# Patient Record
Sex: MALE | Race: WHITE | NOT HISPANIC OR LATINO | Employment: OTHER | URBAN - NONMETROPOLITAN AREA
[De-identification: names, ages, dates, MRNs, and addresses within clinical notes are randomized per-mention and may not be internally consistent; named-entity substitution may affect disease eponyms.]

---

## 2017-01-04 ENCOUNTER — OFFICE VISIT (OUTPATIENT)
Dept: GASTROENTEROLOGY | Facility: CLINIC | Age: 82
End: 2017-01-04

## 2017-01-04 ENCOUNTER — TELEPHONE (OUTPATIENT)
Dept: GASTROENTEROLOGY | Facility: CLINIC | Age: 82
End: 2017-01-04

## 2017-01-04 VITALS
SYSTOLIC BLOOD PRESSURE: 170 MMHG | DIASTOLIC BLOOD PRESSURE: 77 MMHG | BODY MASS INDEX: 23.89 KG/M2 | WEIGHT: 157.6 LBS | HEART RATE: 72 BPM | HEIGHT: 68 IN

## 2017-01-04 DIAGNOSIS — D62 ACUTE BLOOD LOSS ANEMIA: ICD-10-CM

## 2017-01-04 DIAGNOSIS — K62.5 HEMORRHAGE OF ANUS AND RECTUM: Primary | ICD-10-CM

## 2017-01-04 LAB
HCT VFR BLD CALC: 38.4 % (ref 39–49)
HGB BLD-MCNC: 13.2 GM/DL (ref 13.7–17.3)
IRON SERPL-MCNC: 54 UG/DL (ref 49–181)

## 2017-01-04 PROCEDURE — 99213 OFFICE O/P EST LOW 20 MIN: CPT | Performed by: PHYSICIAN ASSISTANT

## 2017-01-04 RX ORDER — ASPIRIN 81 MG/1
81 TABLET ORAL DAILY
COMMUNITY

## 2017-01-04 NOTE — TELEPHONE ENCOUNTER
Patient has been contacted with results, recommend OTC iron for 3 months. Patient voiced understanding.

## 2017-01-04 NOTE — LETTER
January 12, 2017     Al Calero MD  [address] [place] [address]    Patient: Brown Deng   YOB: 1935   Date of Visit: 1/4/2017       Dear Dr. Galilea MD:    Brown Deng was in my office today. Below is a copy of my note.    If you have questions, please do not hesitate to call me. I look forward to following Brown along with you.         Sincerely,        Iraj Strickland PA-C        CC: No Recipients    Chief Complaint   Patient presents with   • Acute Anemia   • Hemorrhage of Anus       Subjective    Brown Deng is a 81 y.o. male. he is here today for follow-up.    History of Present Illness    Patient seen on a recheck of his acute hemorrhage, acute blood loss anemia.  Last seen 11/16/16.  Patient underwent colonoscopy on 11/18/16 showed multiple diverticuli in the sigmoid and descending colon, internal/external hemorrhoids, fair prep.  Recommended repeat colonoscopy in 5 years.  Patient currently denies abdominal pain, nausea vomiting, bowels are moving without further bleeding.  Weight is up 3 pounds.  He has resumed his lower dose aspirin.    A/P: Diverticulosis with a fair prep.  I recommended repeat laboratory studies if he is showing anemia he would need iron supplementation as well as follow-up.  If his studies are normal would suggest no further workup at this time.     The following portions of the patient's history were reviewed and updated as appropriate:   Past Medical History   Diagnosis Date   • Craggy prostate    • Heart trouble    • Pernicious anemia    • Rectal hemorrhage    • Screening for malignant neoplasm of prostate      Past Surgical History   Procedure Laterality Date   • Cardiac ablation     • Upper gastrointestinal endoscopy  01/14/2011   • Colonoscopy  01/14/2011   • Colonoscopy  11/18/2016     Family History   Problem Relation Age of Onset   • Heart disease Other    • Ulcers Other    • Cancer Other    • Cholelithiasis Other    •  "Hypertension Other        No Known Allergies  Social History     Social History   • Marital status:      Spouse name: N/A   • Number of children: N/A   • Years of education: N/A     Social History Main Topics   • Smoking status: Former Smoker   • Smokeless tobacco: Never Used   • Alcohol use No   • Drug use: No   • Sexual activity: Not Asked     Other Topics Concern   • None     Social History Narrative       Current Outpatient Prescriptions:   •  aspirin 81 MG EC tablet, Take 81 mg by mouth Daily., Disp: , Rfl:   •  atorvastatin (LIPITOR) 10 MG tablet, Take 10 mg by mouth Daily., Disp: , Rfl:   •  metoprolol succinate XL (TOPROL-XL) 50 MG 24 hr tablet, Take 50 mg by mouth Daily., Disp: , Rfl:   Review of Systems  Review of Systems       Objective      Visit Vitals   • /77 (BP Location: Left arm)   • Pulse 72   • Ht 68\" (172.7 cm)   • Wt 157 lb 9.6 oz (71.5 kg)   • BMI 23.96 kg/m2     Physical Exam   Constitutional: He is oriented to person, place, and time. He appears well-developed and well-nourished. No distress.   pale   HENT:   Head: Normocephalic and atraumatic.   Eyes: EOM are normal. Pupils are equal, round, and reactive to light.   Neck: Normal range of motion.   Cardiovascular: Normal rate, regular rhythm and normal heart sounds.    Pulmonary/Chest: Effort normal and breath sounds normal.   Abdominal: Soft. Bowel sounds are normal. He exhibits no shifting dullness, no distension, no abdominal bruit, no ascites and no mass. There is no hepatosplenomegaly. There is no tenderness. There is no rigidity, no rebound, no guarding and no CVA tenderness. No hernia. Hernia confirmed negative in the ventral area.   Musculoskeletal: Normal range of motion.   Neurological: He is alert and oriented to person, place, and time.   Skin: Skin is warm and dry.   Psychiatric: He has a normal mood and affect. His behavior is normal. Judgment and thought content normal.   Nursing note and vitals " "reviewed.    Conversion Encounter on 01/14/2011   Component Date Value Ref Range Status   • Spec Descr 1 01/14/2011 SPECIMEN(S): A SMALL BOWEL BIOPSY   Final   • Specimen description 2 01/14/2011 SPECIMEN(S): B ANTRAL BIOPSY   Final   • Clinical Information 01/14/2011    Final                    Value:  CLINICAL HISTORY:  None Given     • Clinical Diagnosis 01/14/2011    Final                    Value:  CLINICAL DIAGNOSIS:  Patient with anemia     • Gross Description 01/14/2011    Final                    Value:  GROSS DESCRIPTION:  Specimen A is labeled \"#1 small bowel\" and consists of a tan fragment  measuring 0.4 x 0.3 x 0.2 cm.  Totally submitted.       Specimen B is labeled \"#2 antrum\" and consists of 2 tan fragments  measuring 0.4 x 0.4 x 0.2 cm together.  Totally submitted.     • Final Diagnosis 01/14/2011    Final                    Value:  FINAL DIAGNOSIS:  A.  SMALL BOWEL, BIOPSY:            NO SIGNIFICANT HISTOLOGIC ABNORMALITY.  B.  GASTRIC ANTRUM, BIOPSY:            LYMPHOID AGGREGATES, FOCAL.            NEGATIVE FOR HELICOBACTER PYLORI (HP IMMUNOSTAIN).     • Comment 01/14/2011    Final                    Value:  COMMENT:  HP immunostain was developed and its performance characteristics  determined by [facility].  It has not been  cleared or approved by the U.S. Food and Drug Administration.  The FDA  has determined that such clearance or approval is not necessary.  This  test is used for clinical purposes.  It should not be regarded as  investigational or for research.  This laboratory is certified under the  Clinical Laboratory Improvement Amendments of 1988 (CLIA-88) as  qualified to perform high complexity clinical laboratory testing.    DIAGNOSIS CODE:  2     • CONVERTED (HISTORICAL) FINAL PATHO* 01/14/2011    Final                    Value:Diagnostician:  JOSELIN CONSTANTINO M.D.  Pathologist  Electronically Signed 01/17/2011       Assessment/Plan      1. Hemorrhage of " "anus and rectum    2. Acute blood loss anemia    .   Brown was seen today for acute anemia and hemorrhage of anus.    Diagnoses and all orders for this visit:    Hemorrhage of anus and rectum  -     Hemoglobin & Hematocrit, Blood  -     Iron    Acute blood loss anemia  -     Hemoglobin & Hematocrit, Blood  -     Iron        Orders placed during this encounter include:  Orders Placed This Encounter   Procedures   • Hemoglobin & Hematocrit, Blood   • Iron       Medications prescribed:  No orders of the defined types were placed in this encounter.    Discontinued Medications       Reason for Discontinue    aspirin 325 MG EC tablet Dose adjustment        Requested Prescriptions      No prescriptions requested or ordered in this encounter       Review and/or summary of lab tests, radiology, procedures, medications. Review and summary of old records and obtaining of history. The risks and benefits of my recommendations, as well as other treatment options were discussed with the patient today. Questions were answered.    Follow-up: Return if symptoms worsen or fail to improve, for Next scheduled follow up.           This document has been electronically signed by Iraj Strickland PA-C on January 12, 2017 6:30 PM      Results for orders placed or performed in visit on 01/04/17   Hemoglobin & Hematocrit, Blood   Result Value Ref Range    Hemoglobin 13.2 (L) 13.7 - 17.3 gm/dl    Hematocrit 38.4 (L) 39.0 - 49.0 %   Iron   Result Value Ref Range    Iron 54 49 - 181 ug/dl   Results for orders placed or performed in visit on 01/14/11   Converted Surgical Pathology   Result Value Ref Range    Spec Descr 1 SPECIMEN(S): A SMALL BOWEL BIOPSY     Specimen description 2 SPECIMEN(S): B ANTRAL BIOPSY     Clinical Information   CLINICAL HISTORY:  None Given       Clinical Diagnosis   CLINICAL DIAGNOSIS:  Patient with anemia       Gross Description         GROSS DESCRIPTION:  Specimen A is labeled \"#1 small bowel\" and consists of a tan " "fragment  measuring 0.4 x 0.3 x 0.2 cm.  Totally submitted.       Specimen B is labeled \"#2 antrum\" and consists of 2 tan fragments  measuring 0.4 x 0.4 x 0.2 cm together.  Totally submitted.      Final Diagnosis         FINAL DIAGNOSIS:  A.  SMALL BOWEL, BIOPSY:            NO SIGNIFICANT HISTOLOGIC ABNORMALITY.  B.  GASTRIC ANTRUM, BIOPSY:            LYMPHOID AGGREGATES, FOCAL.            NEGATIVE FOR HELICOBACTER PYLORI (HP IMMUNOSTAIN).      Comment         COMMENT:  HP immunostain was developed and its performance characteristics  determined by [facility].  It has not been  cleared or approved by the U.S. Food and Drug Administration.  The FDA  has determined that such clearance or approval is not necessary.  This  test is used for clinical purposes.  It should not be regarded as  investigational or for research.  This laboratory is certified under the  Clinical Laboratory Improvement Amendments of 1988 (CLIA-88) as  qualified to perform high complexity clinical laboratory testing.    DIAGNOSIS CODE:  2      CONVERTED (HISTORICAL) FINAL PATHOLOGIST       Diagnostician:  JOSELIN CONSTANTINO M.D.  Pathologist  Electronically Signed 01/17/2011       "

## 2017-01-04 NOTE — PROGRESS NOTES
Chief Complaint   Patient presents with   • Acute Anemia   • Hemorrhage of Anus       Subjective    Brown Deng is a 81 y.o. male. he is here today for follow-up.    History of Present Illness    Patient seen on a recheck of his acute hemorrhage, acute blood loss anemia.  Last seen 11/16/16.  Patient underwent colonoscopy on 11/18/16 showed multiple diverticuli in the sigmoid and descending colon, internal/external hemorrhoids, fair prep.  Recommended repeat colonoscopy in 5 years.  Patient currently denies abdominal pain, nausea vomiting, bowels are moving without further bleeding.  Weight is up 3 pounds.  He has resumed his lower dose aspirin.    A/P: Diverticulosis with a fair prep.  I recommended repeat laboratory studies if he is showing anemia he would need iron supplementation as well as follow-up.  If his studies are normal would suggest no further workup at this time.     The following portions of the patient's history were reviewed and updated as appropriate:   Past Medical History   Diagnosis Date   • Craggy prostate    • Heart trouble    • Pernicious anemia    • Rectal hemorrhage    • Screening for malignant neoplasm of prostate      Past Surgical History   Procedure Laterality Date   • Cardiac ablation     • Upper gastrointestinal endoscopy  01/14/2011   • Colonoscopy  01/14/2011   • Colonoscopy  11/18/2016     Family History   Problem Relation Age of Onset   • Heart disease Other    • Ulcers Other    • Cancer Other    • Cholelithiasis Other    • Hypertension Other        No Known Allergies  Social History     Social History   • Marital status:      Spouse name: N/A   • Number of children: N/A   • Years of education: N/A     Social History Main Topics   • Smoking status: Former Smoker   • Smokeless tobacco: Never Used   • Alcohol use No   • Drug use: No   • Sexual activity: Not Asked     Other Topics Concern   • None     Social History Narrative       Current Outpatient Prescriptions:   •   "aspirin 81 MG EC tablet, Take 81 mg by mouth Daily., Disp: , Rfl:   •  atorvastatin (LIPITOR) 10 MG tablet, Take 10 mg by mouth Daily., Disp: , Rfl:   •  metoprolol succinate XL (TOPROL-XL) 50 MG 24 hr tablet, Take 50 mg by mouth Daily., Disp: , Rfl:   Review of Systems  Review of Systems       Objective      Visit Vitals   • /77 (BP Location: Left arm)   • Pulse 72   • Ht 68\" (172.7 cm)   • Wt 157 lb 9.6 oz (71.5 kg)   • BMI 23.96 kg/m2     Physical Exam   Constitutional: He is oriented to person, place, and time. He appears well-developed and well-nourished. No distress.   pale   HENT:   Head: Normocephalic and atraumatic.   Eyes: EOM are normal. Pupils are equal, round, and reactive to light.   Neck: Normal range of motion.   Cardiovascular: Normal rate, regular rhythm and normal heart sounds.    Pulmonary/Chest: Effort normal and breath sounds normal.   Abdominal: Soft. Bowel sounds are normal. He exhibits no shifting dullness, no distension, no abdominal bruit, no ascites and no mass. There is no hepatosplenomegaly. There is no tenderness. There is no rigidity, no rebound, no guarding and no CVA tenderness. No hernia. Hernia confirmed negative in the ventral area.   Musculoskeletal: Normal range of motion.   Neurological: He is alert and oriented to person, place, and time.   Skin: Skin is warm and dry.   Psychiatric: He has a normal mood and affect. His behavior is normal. Judgment and thought content normal.   Nursing note and vitals reviewed.    Conversion Encounter on 01/14/2011   Component Date Value Ref Range Status   • Spec Descr 1 01/14/2011 SPECIMEN(S): A SMALL BOWEL BIOPSY   Final   • Specimen description 2 01/14/2011 SPECIMEN(S): B ANTRAL BIOPSY   Final   • Clinical Information 01/14/2011    Final                    Value:  CLINICAL HISTORY:  None Given     • Clinical Diagnosis 01/14/2011    Final                    Value:  CLINICAL DIAGNOSIS:  Patient with anemia     • Gross Description " "01/14/2011    Final                    Value:  GROSS DESCRIPTION:  Specimen A is labeled \"#1 small bowel\" and consists of a tan fragment  measuring 0.4 x 0.3 x 0.2 cm.  Totally submitted.       Specimen B is labeled \"#2 antrum\" and consists of 2 tan fragments  measuring 0.4 x 0.4 x 0.2 cm together.  Totally submitted.     • Final Diagnosis 01/14/2011    Final                    Value:  FINAL DIAGNOSIS:  A.  SMALL BOWEL, BIOPSY:            NO SIGNIFICANT HISTOLOGIC ABNORMALITY.  B.  GASTRIC ANTRUM, BIOPSY:            LYMPHOID AGGREGATES, FOCAL.            NEGATIVE FOR HELICOBACTER PYLORI (HP IMMUNOSTAIN).     • Comment 01/14/2011    Final                    Value:  COMMENT:  HP immunostain was developed and its performance characteristics  determined by [facility].  It has not been  cleared or approved by the U.S. Food and Drug Administration.  The FDA  has determined that such clearance or approval is not necessary.  This  test is used for clinical purposes.  It should not be regarded as  investigational or for research.  This laboratory is certified under the  Clinical Laboratory Improvement Amendments of 1988 (CLIA-88) as  qualified to perform high complexity clinical laboratory testing.    DIAGNOSIS CODE:  2     • CONVERTED (HISTORICAL) FINAL PATHO* 01/14/2011    Final                    Value:Diagnostician:  JOSELIN CONSTANTINO M.D.  Pathologist  Electronically Signed 01/17/2011       Assessment/Plan      1. Hemorrhage of anus and rectum    2. Acute blood loss anemia    .   Brown was seen today for acute anemia and hemorrhage of anus.    Diagnoses and all orders for this visit:    Hemorrhage of anus and rectum  -     Hemoglobin & Hematocrit, Blood  -     Iron    Acute blood loss anemia  -     Hemoglobin & Hematocrit, Blood  -     Iron        Orders placed during this encounter include:  Orders Placed This Encounter   Procedures   • Hemoglobin & Hematocrit, Blood   • Iron " "      Medications prescribed:  No orders of the defined types were placed in this encounter.    Discontinued Medications       Reason for Discontinue    aspirin 325 MG EC tablet Dose adjustment        Requested Prescriptions      No prescriptions requested or ordered in this encounter       Review and/or summary of lab tests, radiology, procedures, medications. Review and summary of old records and obtaining of history. The risks and benefits of my recommendations, as well as other treatment options were discussed with the patient today. Questions were answered.    Follow-up: Return if symptoms worsen or fail to improve, for Next scheduled follow up.           This document has been electronically signed by Iraj Strickland PA-C on January 12, 2017 6:30 PM      Results for orders placed or performed in visit on 01/04/17   Hemoglobin & Hematocrit, Blood   Result Value Ref Range    Hemoglobin 13.2 (L) 13.7 - 17.3 gm/dl    Hematocrit 38.4 (L) 39.0 - 49.0 %   Iron   Result Value Ref Range    Iron 54 49 - 181 ug/dl   Results for orders placed or performed in visit on 01/14/11   Converted Surgical Pathology   Result Value Ref Range    Spec Descr 1 SPECIMEN(S): A SMALL BOWEL BIOPSY     Specimen description 2 SPECIMEN(S): B ANTRAL BIOPSY     Clinical Information   CLINICAL HISTORY:  None Given       Clinical Diagnosis   CLINICAL DIAGNOSIS:  Patient with anemia       Gross Description         GROSS DESCRIPTION:  Specimen A is labeled \"#1 small bowel\" and consists of a tan fragment  measuring 0.4 x 0.3 x 0.2 cm.  Totally submitted.       Specimen B is labeled \"#2 antrum\" and consists of 2 tan fragments  measuring 0.4 x 0.4 x 0.2 cm together.  Totally submitted.      Final Diagnosis         FINAL DIAGNOSIS:  A.  SMALL BOWEL, BIOPSY:            NO SIGNIFICANT HISTOLOGIC ABNORMALITY.  B.  GASTRIC ANTRUM, BIOPSY:            LYMPHOID AGGREGATES, FOCAL.            NEGATIVE FOR HELICOBACTER PYLORI (HP IMMUNOSTAIN).      Comment   "       COMMENT:  HP immunostain was developed and its performance characteristics  determined by [facility].  It has not been  cleared or approved by the U.S. Food and Drug Administration.  The FDA  has determined that such clearance or approval is not necessary.  This  test is used for clinical purposes.  It should not be regarded as  investigational or for research.  This laboratory is certified under the  Clinical Laboratory Improvement Amendments of 1988 (CLIA-88) as  qualified to perform high complexity clinical laboratory testing.    DIAGNOSIS CODE:  2      CONVERTED (HISTORICAL) FINAL PATHOLOGIST       Diagnostician:  JOSELIN CONSTANTINO M.D.  Pathologist  Electronically Signed 01/17/2011

## 2017-01-04 NOTE — MR AVS SNAPSHOT
Brown Deng   1/4/2017 2:15 PM   Office Visit    Dept Phone:  [phone]   Encounter #:  [id number]    Provider:  Iraj Strickland PA-C   Department:  [facility] GASTROENTEROLOGY                Your Full Care Plan              Today's Medication Changes          These changes are accurate as of: 1/4/17  2:16 PM.  If you have any questions, ask your nurse or doctor.               Medication(s)that have changed:     aspirin 81 MG EC tablet   Take 81 mg by mouth Daily.   What changed:  Another medication with the same name was removed. Continue taking this medication, and follow the directions you see here.   Changed by:  Iraj Strickland PA-C                  Your Updated Medication List          This list is accurate as of: 1/4/17  2:16 PM.  Always use your most recent med list.                aspirin 81 MG EC tablet       atorvastatin 10 MG tablet   Commonly known as:  LIPITOR       metoprolol succinate XL 50 MG 24 hr tablet   Commonly known as:  TOPROL-XL               We Performed the Following     Hemoglobin & Hematocrit, Blood     Iron       You Were Diagnosed With        Codes Comments    Hemorrhage of anus and rectum    -  Primary ICD-10-CM: K62.5  ICD-9-CM: 569.3     Acute blood loss anemia     ICD-10-CM: D62  ICD-9-CM: 285.1       Instructions     None    Patient Instructions History      Upcoming Appointments     Visit Type Date Time Department    OFFICE VISIT 1/4/2017  2:15 PM [facility]STROENT  [facility]    OFFICE VISIT 3/6/2017  1:15 PM [facility] GASTROENT  [facility]    OFFICE VISIT 8/25/2017  9:00 AM [facility] HEART CARE [facility] Signup     Our records indicate that you have an active [email or web address] account.    You can view your After Visit Summary by going to [email or web address] and logging in with your [employer] username and password.  If you don't have a [employer] username and password but a parent or guardian has access to your record, the parent or guardian  "should login with their own [employer] username and password and access your record to view the After Visit Summary.    If you have questions, you can email [email or web address] or call [phone] to talk to our [employer] staff.  Remember, [employer] is NOT to be used for urgent needs.  For medical emergencies, dial 911.               Other Info from Your Visit           Your Appointments     Mar 06, 2017  1:15 PM CST   Office Visit with Iraj Strickland PA-C   [facility] GASTROENTEROLOGY (--)    [address] [place] [address] [address]  [facility] [address] [place] [address] [phone]           Arrive 15 minutes prior to appointment.            Aug 25, 2017  9:00 AM CDT   Office Visit with Ceferino Young MD   [facility] CARDIOLOGY (--)    [address] [facility] [address]  [facility] [address] [phone]           Arrive 15 minutes prior to appointment.              Allergies     No Known Allergies      Reason for Visit     Acute Anemia     Hemorrhage of Anus           Vital Signs     Blood Pressure Pulse Height Weight Body Mass Index Smoking Status    170/77 (BP Location: Left arm) 72 68\" (172.7 cm) 157 lb 9.6 oz (71.5 kg) 23.96 kg/m2 Former Smoker      Problems and Diagnoses Noted     Hemorrhage of anus and rectum    -  Primary    Acute blood loss anemia            "

## 2017-01-16 RX ORDER — METOPROLOL SUCCINATE 50 MG/1
TABLET, EXTENDED RELEASE ORAL
Qty: 90 TABLET | Refills: 3 | Status: SHIPPED | OUTPATIENT
Start: 2017-01-16 | End: 2017-03-08 | Stop reason: SDUPTHER

## 2017-03-06 ENCOUNTER — APPOINTMENT (OUTPATIENT)
Dept: LAB | Facility: HOSPITAL | Age: 82
End: 2017-03-06

## 2017-03-06 ENCOUNTER — OFFICE VISIT (OUTPATIENT)
Dept: GASTROENTEROLOGY | Facility: CLINIC | Age: 82
End: 2017-03-06

## 2017-03-06 VITALS
SYSTOLIC BLOOD PRESSURE: 182 MMHG | HEIGHT: 68 IN | DIASTOLIC BLOOD PRESSURE: 90 MMHG | WEIGHT: 154.7 LBS | BODY MASS INDEX: 23.45 KG/M2 | HEART RATE: 69 BPM

## 2017-03-06 DIAGNOSIS — K62.5 HEMORRHAGE OF ANUS AND RECTUM: Primary | ICD-10-CM

## 2017-03-06 DIAGNOSIS — D50.0 IRON DEFICIENCY ANEMIA DUE TO CHRONIC BLOOD LOSS: ICD-10-CM

## 2017-03-06 LAB
HCT VFR BLD AUTO: 45.6 % (ref 39–49)
HGB BLD-MCNC: 16 G/DL (ref 13.7–17.3)
IRON 24H UR-MRATE: 90 MCG/DL (ref 49–181)

## 2017-03-06 PROCEDURE — 36415 COLL VENOUS BLD VENIPUNCTURE: CPT | Performed by: PHYSICIAN ASSISTANT

## 2017-03-06 PROCEDURE — 85018 HEMOGLOBIN: CPT | Performed by: PHYSICIAN ASSISTANT

## 2017-03-06 PROCEDURE — 83540 ASSAY OF IRON: CPT | Performed by: PHYSICIAN ASSISTANT

## 2017-03-06 PROCEDURE — 99213 OFFICE O/P EST LOW 20 MIN: CPT | Performed by: PHYSICIAN ASSISTANT

## 2017-03-06 PROCEDURE — 85014 HEMATOCRIT: CPT | Performed by: PHYSICIAN ASSISTANT

## 2017-03-06 RX ORDER — FERROUS SULFATE 325(65) MG
325 TABLET ORAL
COMMUNITY
End: 2018-09-06

## 2017-03-06 NOTE — PROGRESS NOTES
Chief Complaint   Patient presents with   • Acute Blood Loss Anemia   • Hemorrhage of anus and rectum       ENDO PROCEDURE ORDERED:    Subjective    Brown Deng is a 81 y.o. male. he is here today for follow-up.    History of Present Illness    Patient seen on a recheck of his acute GI hemorrhage, anemia.  Last seen 1/4/17.  Patient is been taking over-the-counter iron, laboratory at last visit showed hemoglobin 13.2, hematocrit 38.4, serum iron of 54.  Patient is continue to take iron supplementation.  His weight is down 3 pounds since last visit.  He denies abdominal pain, nausea vomiting, bowels are moving, he is seen no further blood.  Last colonoscopy showed extensive diverticulosis on 11/18/16.    A/P: Iron deficiency anemia secondary to acute GI hemorrhage.  We'll repeat laboratories, if there are now normal he may discontinue iron supplementation and follow up as previously scheduled.  If he still shows anemia will continue to follow.     The following portions of the patient's history were reviewed and updated as appropriate:   Past Medical History   Diagnosis Date   • Craggy prostate    • Heart trouble    • Pernicious anemia    • Rectal hemorrhage    • Screening for malignant neoplasm of prostate      Past Surgical History   Procedure Laterality Date   • Cardiac ablation     • Upper gastrointestinal endoscopy  01/14/2011   • Colonoscopy  01/14/2011   • Colonoscopy  11/18/2016     Family History   Problem Relation Age of Onset   • Heart disease Other    • Ulcers Other    • Cancer Other    • Cholelithiasis Other    • Hypertension Other        No Known Allergies  Social History     Social History   • Marital status:      Spouse name: N/A   • Number of children: N/A   • Years of education: N/A     Social History Main Topics   • Smoking status: Former Smoker   • Smokeless tobacco: Never Used   • Alcohol use No   • Drug use: No   • Sexual activity: Not Asked     Other Topics Concern   • None  "    Social History Narrative       Current Outpatient Prescriptions:   •  aspirin 81 MG EC tablet, Take 81 mg by mouth Daily., Disp: , Rfl:   •  ferrous sulfate 325 (65 FE) MG tablet, Take 325 mg by mouth Daily With Breakfast., Disp: , Rfl:   •  atorvastatin (LIPITOR) 10 MG tablet, Take 1 tablet by mouth Daily., Disp: 90 tablet, Rfl: 2  •  metoprolol succinate XL (TOPROL-XL) 50 MG 24 hr tablet, Take 1 tablet by mouth Daily., Disp: 90 tablet, Rfl: 2  Review of Systems  Review of Systems       Objective      Visit Vitals   • BP (!) 182/90 (BP Location: Left arm)   • Pulse 69   • Ht 68\" (172.7 cm)   • Wt 154 lb 11.2 oz (70.2 kg)   • BMI 23.52 kg/m2     Physical Exam   Constitutional: He is oriented to person, place, and time. He appears well-developed and well-nourished. No distress.   pale   HENT:   Head: Normocephalic and atraumatic.   Eyes: EOM are normal. Pupils are equal, round, and reactive to light.   Neck: Normal range of motion.   Cardiovascular: Normal rate, regular rhythm and normal heart sounds.    Pulmonary/Chest: Effort normal and breath sounds normal.   Abdominal: Soft. Bowel sounds are normal. He exhibits no shifting dullness, no distension, no abdominal bruit, no ascites and no mass. There is no hepatosplenomegaly. There is no tenderness. There is no rigidity, no rebound, no guarding and no CVA tenderness. No hernia. Hernia confirmed negative in the ventral area.   Musculoskeletal: Normal range of motion.   Neurological: He is alert and oriented to person, place, and time.   Skin: Skin is warm and dry.   Psychiatric: He has a normal mood and affect. His behavior is normal. Judgment and thought content normal.   Nursing note and vitals reviewed.    Office Visit on 01/04/2017   Component Date Value Ref Range Status   • Hemoglobin 01/04/2017 13.2* 13.7 - 17.3 gm/dl Final   • Hematocrit 01/04/2017 38.4* 39.0 - 49.0 % Final   • Iron 01/04/2017 54  49 - 181 ug/dl Final     Assessment/Plan      1. Hemorrhage " of anus and rectum    2. Iron deficiency anemia due to chronic blood loss    .   Brown was seen today for acute blood loss anemia and hemorrhage of anus and rectum.    Diagnoses and all orders for this visit:    Hemorrhage of anus and rectum  -     Hemoglobin & Hematocrit, Blood  -     Iron    Iron deficiency anemia due to chronic blood loss  -     Hemoglobin & Hematocrit, Blood  -     Iron        Orders placed during this encounter include:  Orders Placed This Encounter   Procedures   • Hemoglobin & Hematocrit, Blood   • Iron       Medications prescribed:  No orders of the defined types were placed in this encounter.      Requested Prescriptions      No prescriptions requested or ordered in this encounter       Review and/or summary of lab tests, radiology, procedures, medications. Review and summary of old records and obtaining of history. The risks and benefits of my recommendations, as well as other treatment options were discussed with the patient today. Questions were answered.    Follow-up: Return if symptoms worsen or fail to improve, for lab today.     * Surgery not found *      This document has been electronically signed by Iraj Strickland PA-C on March 13, 2017 7:03 PM      Results for orders placed or performed in visit on 03/06/17   Hemoglobin & Hematocrit, Blood   Result Value Ref Range    Hemoglobin 16.0 13.7 - 17.3 g/dL    Hematocrit 45.6 39.0 - 49.0 %   Iron   Result Value Ref Range    Iron 90 49 - 181 mcg/dL   Results for orders placed or performed in visit on 01/04/17   Hemoglobin & Hematocrit, Blood   Result Value Ref Range    Hemoglobin 13.2 (L) 13.7 - 17.3 gm/dl    Hematocrit 38.4 (L) 39.0 - 49.0 %   Iron   Result Value Ref Range    Iron 54 49 - 181 ug/dl   Results for orders placed or performed in visit on 01/14/11   Converted Surgical Pathology   Result Value Ref Range    Spec Descr 1 SPECIMEN(S): A SMALL BOWEL BIOPSY     Specimen description 2 SPECIMEN(S): B ANTRAL BIOPSY     Clinical  "Information   CLINICAL HISTORY:  None Given       Clinical Diagnosis   CLINICAL DIAGNOSIS:  Patient with anemia       Gross Description         GROSS DESCRIPTION:  Specimen A is labeled \"#1 small bowel\" and consists of a tan fragment  measuring 0.4 x 0.3 x 0.2 cm.  Totally submitted.       Specimen B is labeled \"#2 antrum\" and consists of 2 tan fragments  measuring 0.4 x 0.4 x 0.2 cm together.  Totally submitted.      Final Diagnosis         FINAL DIAGNOSIS:  A.  SMALL BOWEL, BIOPSY:            NO SIGNIFICANT HISTOLOGIC ABNORMALITY.  B.  GASTRIC ANTRUM, BIOPSY:            LYMPHOID AGGREGATES, FOCAL.            NEGATIVE FOR HELICOBACTER PYLORI (HP IMMUNOSTAIN).      Comment         COMMENT:  HP immunostain was developed and its performance characteristics  determined by [facility].  It has not been  cleared or approved by the U.S. Food and Drug Administration.  The FDA  has determined that such clearance or approval is not necessary.  This  test is used for clinical purposes.  It should not be regarded as  investigational or for research.  This laboratory is certified under the  Clinical Laboratory Improvement Amendments of 1988 (CLIA-88) as  qualified to perform high complexity clinical laboratory testing.    DIAGNOSIS CODE:  2      CONVERTED (HISTORICAL) FINAL PATHOLOGIST       Diagnostician:  JOSELIN CONSTANTINO M.D.  Pathologist  Electronically Signed 01/17/2011         Some portions of this note have been dictated using voice recognition software and may contain errors and/or omissions.   "

## 2017-03-07 ENCOUNTER — TELEPHONE (OUTPATIENT)
Dept: GASTROENTEROLOGY | Facility: CLINIC | Age: 82
End: 2017-03-07

## 2017-03-07 NOTE — TELEPHONE ENCOUNTER
----- Message from Iraj Strickland PA-C sent at 3/6/2017  5:01 PM CST -----  Please call the patient regarding his abnormal result. LABS normal may discontinue Iron

## 2017-03-07 NOTE — TELEPHONE ENCOUNTER
A voicemail was left for Mr. Brown Deng on 3-6-17 for the patient to return my call for lab results. The patient's son Rohan Wilkerson called back today on the patient's behalf for those results. Patient's son Rohan has been made aware that Mr. Brown Deng's lab's were normal and that the patient may discontinue his iron. Rohan Deng voiced understanding.

## 2017-03-08 RX ORDER — METOPROLOL SUCCINATE 50 MG/1
50 TABLET, EXTENDED RELEASE ORAL DAILY
Qty: 90 TABLET | Refills: 2 | Status: SHIPPED | OUTPATIENT
Start: 2017-03-08 | End: 2017-11-21 | Stop reason: SDUPTHER

## 2017-03-08 RX ORDER — ATORVASTATIN CALCIUM 10 MG/1
10 TABLET, FILM COATED ORAL DAILY
Qty: 90 TABLET | Refills: 2 | Status: SHIPPED | OUTPATIENT
Start: 2017-03-08 | End: 2017-11-21 | Stop reason: SDUPTHER

## 2017-08-25 ENCOUNTER — OFFICE VISIT (OUTPATIENT)
Dept: CARDIOLOGY | Facility: CLINIC | Age: 82
End: 2017-08-25

## 2017-08-25 VITALS
DIASTOLIC BLOOD PRESSURE: 80 MMHG | BODY MASS INDEX: 23.04 KG/M2 | HEIGHT: 68 IN | SYSTOLIC BLOOD PRESSURE: 120 MMHG | HEART RATE: 70 BPM | WEIGHT: 152 LBS

## 2017-08-25 DIAGNOSIS — R00.2 PALPITATION: ICD-10-CM

## 2017-08-25 DIAGNOSIS — I48.3 TYPICAL ATRIAL FLUTTER (HCC): ICD-10-CM

## 2017-08-25 DIAGNOSIS — E78.5 HYPERLIPIDEMIA, UNSPECIFIED HYPERLIPIDEMIA TYPE: ICD-10-CM

## 2017-08-25 DIAGNOSIS — I10 ESSENTIAL HYPERTENSION: Primary | ICD-10-CM

## 2017-08-25 PROCEDURE — 99213 OFFICE O/P EST LOW 20 MIN: CPT | Performed by: INTERNAL MEDICINE

## 2017-08-25 NOTE — PROGRESS NOTES
Brown Deng  81 y.o. male    08/25/2017  1. Essential hypertension    2. Hyperlipidemia, unspecified hyperlipidemia type    3. Typical atrial flutter    4. Palpitation        History of Present Illness:Routine follow-up    81 years old patient to had to medical history significant for palpitations, documented atrial flutter with counterclockwise mechanism underwent EP study and flutter ablation with bidirectional block.  The patient noted to have palpitation secondary to premature supraventricular ectopic beat.  Started on metoprolol without  further recurrence of palpitation.  He is  pleased with the clinical outcome.  He denies orthopnea, PND, nauseous, vomiting.  Denies any polydipsia polyuria.  Denies any fever cough or chills.  Had a previous echocardiographic study  In 2011 with normal left  systolic function without any significant valvular abnormality  .            SUBJECTIVE    No Known Allergies      Past Medical History:   Diagnosis Date   • Craggy prostate    • Heart trouble    • Pernicious anemia    • Rectal hemorrhage    • Screening for malignant neoplasm of prostate          Past Surgical History:   Procedure Laterality Date   • CARDIAC ABLATION     • COLONOSCOPY  01/14/2011   • COLONOSCOPY  11/18/2016   • UPPER GASTROINTESTINAL ENDOSCOPY  01/14/2011         Family History   Problem Relation Age of Onset   • Heart disease Other    • Ulcers Other    • Cancer Other    • Cholelithiasis Other    • Hypertension Other          Social History     Social History   • Marital status:      Spouse name: N/A   • Number of children: N/A   • Years of education: N/A     Occupational History   • Not on file.     Social History Main Topics   • Smoking status: Former Smoker   • Smokeless tobacco: Never Used   • Alcohol use No   • Drug use: No   • Sexual activity: Not on file     Other Topics Concern   • Not on file     Social History Narrative         Current Outpatient Prescriptions   Medication Sig  "Dispense Refill   • aspirin 81 MG EC tablet Take 81 mg by mouth Daily.     • atorvastatin (LIPITOR) 10 MG tablet Take 1 tablet by mouth Daily. 90 tablet 2   • ferrous sulfate 325 (65 FE) MG tablet Take 325 mg by mouth Daily With Breakfast.     • metoprolol succinate XL (TOPROL-XL) 50 MG 24 hr tablet Take 1 tablet by mouth Daily. 90 tablet 2     No current facility-administered medications for this visit.          OBJECTIVE    /80  Pulse 70  Ht 68\" (172.7 cm)  Wt 152 lb (68.9 kg)  BMI 23.11 kg/m2        Review of Systems     Constitutional:  Denies recent weight loss, weight gain, fever or chills, no change in exercise tolerance     HENT:  Denies any hearing loss, epistaxis, hoarseness, or difficulty speaking.     Eyes: Wears eyeglasses or contact lenses     Respiratory:  Denies dyspnea with exertion,no cough, wheezing, or hemoptysis.     Cardiovascular: See H&P     Gastrointestinal:  Denies change in bowel habits, dyspepsia, ulcer disease, hematochezia, or melena.     Endocrine: Negative for cold intolerance, heat intolerance, polydipsia, polyphagia and polyuria. Denies any history of weight change, heat/cold intolerance, polydipsia, polyuria     Genitourinary: Negative.      Musculoskeletal: Denies any history of arthritic symptoms or back problems     Skin:  Denies any change in hair or nails, rashes, or skin lesions.     Allergic/Immunologic: Negative.  Negative for environmental allergies, food allergies and immunocompromised state.     Neurological:  Denies any history of recurrent headaches, strokes, TIA, or seizure disorder.     Hematological: Denies any food allergies, seasonal allergies, bleeding disorders, or lymphadenopathy.     Psychiatric/Behavioral: Denies any history of depression, substance abuse, or change in cognitive function.         Physical Exam     Constitutional: Cooperative, alert and oriented, well-developed, well-nourished, in no acute distress.     HENT:   Head: Normocephalic, " normal hair patterns, no masses or tenderness.  Ears, Nose, and Throat: No gross abnormalities. No pallor or cyanosis. Dentition good.   Eyes: EOMS intact, PERRL, conjunctivae and lids unremarkable. Fundoscopic exam and visual fields not performed.   Neck: No palpable masses or adenopathy, no thyromegaly, no JVD, carotid pulses are full and equal bilaterally and without  Bruits.     Cardiovascular: Regular rhythm, S1 and S2 normal, no S3 or S4. Apical impulse not displaced. No murmurs, gallops, or rubs detected.     Pulmonary/Chest: Chest: normal symmetry, no tenderness to palpation, normal respiratory excursion, no intercostal retraction, no use of accessory muscles.            Pulmonary: Normal breath sounds. No rales or ronchi.    Abdominal: Abdomen soft, bowel sounds normoactive, no masses, no hepatosplenomegaly, non-tender, no bruits.     Musculoskeletal: No deformities, clubbing, cyanosis, erythema, or edema observed. There are no spinal abnormalities noted. Normal muscle strength and tone. Pulses full and equal in all extremities, no bruits auscultated.     Neurological: No gross motor or sensory deficits noted, affect appropriate, oriented to time, person, place.     Skin: Warm and dry to the touch, no apparent skin lesions or masses noted.     Psychiatric: He has a normal mood and affect. His behavior is normal. Judgment and thought content normal.         Procedures      Lab Results   Component Value Date    HGB 16.0 03/06/2017    HCT 45.6 03/06/2017     No results found for: GLUCOSE, BUN, CREATININE, EGFRIFNONA, EGFRIFAFRI, BCR, CO2, CALCIUM, PROTENTOTREF, ALBUMIN, LABIL2, AST, ALT  No results found for: CHOL  No results found for: TRIG  No results found for: HDL  No results found for: LDLCALC  No results found for: LDL  No results found for: HDLLDLRATIO  No components found for: CHOLHDL  No results found for: HGBA1C  No results found for: TSH, N8OLDUH, Y3BMYHZ, THYROIDAB        ASSESSMENT AND PLAN  #1  atrial flutter status post EP study and flutter ablation #2 palpitations secondary to premature atrial complex #3 hypertension with good blood pressure control number for hyperlipidemia treated with simvastatin 10 mg.    Clinically, no evidence of congestive heart failure or active ischemia at the time of evaluation.  The patient is pleased with the clinical outcome.  He will continue metoprolol for palpitations secondary to premature atrial complex, atorvastatin for management of hyperlipidemia and baby aspirin.  The last echocardiographic study in 2011 with a normal left ventricle systolic function and no significant valvular abnormality.  We will see him back in one year R depends on patient clinical conditions    Diagnoses and all orders for this visit:    Essential hypertension    Hyperlipidemia, unspecified hyperlipidemia type    Typical atrial flutter    Palpitation        Ceferino Young MD  8/25/2017  9:38 AM

## 2017-10-17 ENCOUNTER — FLU SHOT (OUTPATIENT)
Dept: FAMILY MEDICINE CLINIC | Facility: CLINIC | Age: 82
End: 2017-10-17

## 2017-10-17 PROCEDURE — G0008 ADMIN INFLUENZA VIRUS VAC: HCPCS | Performed by: FAMILY MEDICINE

## 2017-10-17 PROCEDURE — 90662 IIV NO PRSV INCREASED AG IM: CPT | Performed by: FAMILY MEDICINE

## 2017-11-21 RX ORDER — METOPROLOL SUCCINATE 50 MG/1
50 TABLET, EXTENDED RELEASE ORAL DAILY
Qty: 90 TABLET | Refills: 2 | Status: SHIPPED | OUTPATIENT
Start: 2017-11-21 | End: 2018-08-09 | Stop reason: SDUPTHER

## 2017-11-21 RX ORDER — ATORVASTATIN CALCIUM 10 MG/1
10 TABLET, FILM COATED ORAL DAILY
Qty: 90 TABLET | Refills: 2 | Status: SHIPPED | OUTPATIENT
Start: 2017-11-21 | End: 2018-08-09 | Stop reason: SDUPTHER

## 2017-12-04 RX ORDER — METOPROLOL SUCCINATE 50 MG/1
TABLET, EXTENDED RELEASE ORAL
Qty: 90 TABLET | Refills: 2 | Status: SHIPPED | OUTPATIENT
Start: 2017-12-04 | End: 2018-08-24 | Stop reason: SDUPTHER

## 2017-12-04 RX ORDER — ATORVASTATIN CALCIUM 10 MG/1
TABLET, FILM COATED ORAL
Qty: 90 TABLET | Refills: 2 | Status: SHIPPED | OUTPATIENT
Start: 2017-12-04 | End: 2018-08-24 | Stop reason: SDUPTHER

## 2018-08-09 ENCOUNTER — OFFICE VISIT (OUTPATIENT)
Dept: OTOLARYNGOLOGY | Facility: CLINIC | Age: 83
End: 2018-08-09

## 2018-08-09 VITALS — BODY MASS INDEX: 25.04 KG/M2 | WEIGHT: 155.8 LBS | HEIGHT: 66 IN | RESPIRATION RATE: 18 BRPM

## 2018-08-09 DIAGNOSIS — H61.23 BILATERAL IMPACTED CERUMEN: Primary | ICD-10-CM

## 2018-08-09 PROCEDURE — 99202 OFFICE O/P NEW SF 15 MIN: CPT | Performed by: OTOLARYNGOLOGY

## 2018-08-09 PROCEDURE — 69210 REMOVE IMPACTED EAR WAX UNI: CPT | Performed by: OTOLARYNGOLOGY

## 2018-08-09 RX ORDER — ATORVASTATIN CALCIUM 10 MG/1
10 TABLET, FILM COATED ORAL DAILY
Qty: 90 TABLET | Refills: 0 | Status: SHIPPED | OUTPATIENT
Start: 2018-08-09 | End: 2018-11-19 | Stop reason: SDUPTHER

## 2018-08-09 RX ORDER — METOPROLOL SUCCINATE 50 MG/1
50 TABLET, EXTENDED RELEASE ORAL DAILY
Qty: 90 TABLET | Refills: 0 | Status: SHIPPED | OUTPATIENT
Start: 2018-08-09 | End: 2018-11-19 | Stop reason: SDUPTHER

## 2018-08-13 NOTE — PROGRESS NOTES
Subjective   Brown Deng is a 82 y.o. male.     History of Present Illness     Patient reports that his hearing abruptly decrease in both ears about a month and a half ago.  Nothing in particular brought this on.  He used some over-the-counter wax removal and got some material out that seemed to bring his hearing  to baseline but he wants to make sure he doesn't need more done.  He has not had any previous otologic surgery.  No otorrhea.    The following portions of the patient's history were reviewed and updated as appropriate: allergies, current medications, past family history, past medical history, past social history, past surgical history and problem list.      Brown Deng reports that he has quit smoking. He has never used smokeless tobacco. He reports that he does not drink alcohol or use drugs.  Patient is not a tobacco user and has not been counseled for use of tobacco products    Family History   Problem Relation Age of Onset   • Heart disease Other    • Ulcers Other    • Cancer Other    • Cholelithiasis Other    • Hypertension Other        No Known Allergies      Current Outpatient Prescriptions:   •  aspirin 81 MG EC tablet, Take 81 mg by mouth Daily., Disp: , Rfl:   •  atorvastatin (LIPITOR) 10 MG tablet, TAKE 1 TABLET BY MOUTH DAILY, Disp: 90 tablet, Rfl: 2  •  atorvastatin (LIPITOR) 10 MG tablet, TAKE 1 TABLET BY MOUTH  DAILY, Disp: 90 tablet, Rfl: 0  •  metoprolol succinate XL (TOPROL-XL) 50 MG 24 hr tablet, TAKE 1 TABLET BY MOUTH DAILY, Disp: 90 tablet, Rfl: 2  •  metoprolol succinate XL (TOPROL-XL) 50 MG 24 hr tablet, TAKE 1 TABLET BY MOUTH  DAILY, Disp: 90 tablet, Rfl: 0  •  ferrous sulfate 325 (65 FE) MG tablet, Take 325 mg by mouth Daily With Breakfast., Disp: , Rfl:     Past Medical History:   Diagnosis Date   • Craggy prostate    • Heart trouble    • Pernicious anemia    • Rectal hemorrhage    • Screening for malignant neoplasm of prostate          Review of Systems    HENT: Positive for hearing loss.    All other systems reviewed and are negative.          Objective   Physical Exam  General: Well-developed, well-nourished male in no acute distress.  Head normocephalic.  Alert and oriented.  Voice: Strong.  Speech: Fluent.  Ears: Both ear canals show cerumen impactions  Using the binocular microscope for visualization, cerumen impaction was removed from bilateral ear canal(s) using instrumentation. This was personally performed by Yo Castro MD  Following cerumen removal tympanic membranes are noted to be intact and clear.  Patient did note some additional subjective improvement in hearing.  Feels like he is deathly back at his baseline.  Declines an audiogram.          Assessment/Plan   Brown was seen today for hearing loss.    Diagnoses and all orders for this visit:    Bilateral impacted cerumen      Plan: Cerumen removed as described above.  Return in 1 year to evaluate for recurrence call sooner for problems.

## 2018-08-24 ENCOUNTER — OFFICE VISIT (OUTPATIENT)
Dept: CARDIOLOGY | Facility: CLINIC | Age: 83
End: 2018-08-24

## 2018-08-24 VITALS
HEIGHT: 66 IN | SYSTOLIC BLOOD PRESSURE: 148 MMHG | WEIGHT: 154 LBS | DIASTOLIC BLOOD PRESSURE: 82 MMHG | HEART RATE: 80 BPM | BODY MASS INDEX: 24.75 KG/M2

## 2018-08-24 DIAGNOSIS — I10 ESSENTIAL HYPERTENSION: ICD-10-CM

## 2018-08-24 DIAGNOSIS — I48.3 TYPICAL ATRIAL FLUTTER (HCC): Primary | ICD-10-CM

## 2018-08-24 DIAGNOSIS — R00.2 PALPITATION: ICD-10-CM

## 2018-08-24 DIAGNOSIS — E78.5 HYPERLIPIDEMIA, UNSPECIFIED HYPERLIPIDEMIA TYPE: ICD-10-CM

## 2018-08-24 PROCEDURE — 93000 ELECTROCARDIOGRAM COMPLETE: CPT | Performed by: INTERNAL MEDICINE

## 2018-08-24 PROCEDURE — 99213 OFFICE O/P EST LOW 20 MIN: CPT | Performed by: INTERNAL MEDICINE

## 2018-08-24 NOTE — PROGRESS NOTES
Brown Deng  82 y.o. male    08/24/2018  1. Typical atrial flutter (CMS/HCC)    2. Hyperlipidemia, unspecified hyperlipidemia type    3. Essential hypertension    4. Palpitation        History of Present Illness:    82 years old patient to had to medical history significant for palpitations, documented atrial flutter with counterclockwise mechanism underwent EP study and flutter ablation with bidirectional block.  The patient noted to have palpitation secondary to premature supraventricular ectopic beat.  Started on metoprolol without  further recurrence of palpitation.  He is  pleased with the clinical outcome.  He denies orthopnea, PND, nauseous, vomiting.  Denies any polydipsia polyuria.  Denies any fever cough or chills.  Had a previous echocardiographic study  In 2011 with normal left  systolic function without any significant valvular abnormality          SUBJECTIVE:    No Known Allergies      Past Medical History:   Diagnosis Date   • Craggy prostate    • Heart trouble    • Pernicious anemia    • Rectal hemorrhage    • Screening for malignant neoplasm of prostate          Past Surgical History:   Procedure Laterality Date   • CARDIAC ABLATION     • COLONOSCOPY  01/14/2011   • COLONOSCOPY  11/18/2016   • UPPER GASTROINTESTINAL ENDOSCOPY  01/14/2011         Family History   Problem Relation Age of Onset   • Heart disease Other    • Ulcers Other    • Cancer Other    • Cholelithiasis Other    • Hypertension Other          Social History     Social History   • Marital status:      Spouse name: N/A   • Number of children: N/A   • Years of education: N/A     Occupational History   • Not on file.     Social History Main Topics   • Smoking status: Former Smoker   • Smokeless tobacco: Never Used   • Alcohol use No   • Drug use: No   • Sexual activity: Not on file     Other Topics Concern   • Not on file     Social History Narrative   • No narrative on file         Current Outpatient Prescriptions  "  Medication Sig Dispense Refill   • aspirin 81 MG EC tablet Take 81 mg by mouth Daily.     • atorvastatin (LIPITOR) 10 MG tablet TAKE 1 TABLET BY MOUTH  DAILY 90 tablet 0   • ferrous sulfate 325 (65 FE) MG tablet Take 325 mg by mouth Daily With Breakfast.     • metoprolol succinate XL (TOPROL-XL) 50 MG 24 hr tablet TAKE 1 TABLET BY MOUTH  DAILY 90 tablet 0     No current facility-administered medications for this visit.            Review of Systems:     Constitutional:  Denies recent weight loss, weight gain, fever or chills, no change in exercise tolerance.     HENT:  Denies any hearing loss, epistaxis, hoarseness, or difficulty speaking.     Eyes: No blurring    Respiratory:  Denies dyspnea with exertion,no cough, wheezing, or hemoptysis.     Cardiovascular: See H&P    Gastrointestinal:  Denies change in bowel habits, dyspepsia, ulcer disease, hematochezia, or melena.     Endocrine: Negative for cold intolerance, heat intolerance, polydipsia, polyphagia and polyuria. Denies any history of weight change, polydipsia, polyuria.     Genitourinary: Negative.      Musculoskeletal: Denies any history of arthritic symptoms or back problems.     Skin:  Denies any change in hair or nails, rashes, or skin lesions.     Allergic/Immunologic: Negative.  Negative for environmental allergies, food allergies and immunocompromised state.     Neurological:  Denies any history of recurrent headaches, strokes, TIA, or seizure disorder.     Hematological: Denies any food allergies, seasonal allergies, bleeding disorders, or lymphadenopathy.     Psychiatric/Behavioral: Denies any history of depression, substance abuse, or change in cognitive function.       OBJECTIVE:    /82   Pulse 80   Ht 167.6 cm (65.98\")   Wt 69.9 kg (154 lb)   BMI 24.87 kg/m²       Physical Exam:     Constitutional: Cooperative, alert and oriented, well-developed, well-nourished, in no acute distress.     HENT:   Head: Normocephalic, normal hair " patterns, no masses or tenderness.  Ears, Nose, and Throat: No gross abnormalities. No pallor or cyanosis. Dentition good.   Eyes: EOMS intact, PERRL, conjunctivae and lids unremarkable. Fundoscopic exam and visual fields not performed.   Neck: No palpable masses or adenopathy, no thyromegaly, no JVD, carotid pulses are full and equal bilaterally and without  Bruits.     Cardiovascular: Regular rhythm, S1 and S2 normal, no S3 or S4. Apical impulse not displaced. No murmurs, gallops, or rubs detected.     Pulmonary/Chest: Chest: normal symmetry, no tenderness to palpation, normal respiratory excursion, no intercostal retraction, no use of accessory muscles. Pulmonary: Normal breath sounds. No rales or rhonchi.    Abdominal: Abdomen soft, bowel sounds normoactive, no masses, no hepatosplenomegaly, non-tender, no bruits.     Musculoskeletal: No deformities, clubbing, cyanosis, erythema, or edema observed. There are no spinal abnormalities noted. Normal muscle strength and tone. Pulses full and equal in all extremities, no bruits auscultated.     Neurological: No gross motor or sensory deficits noted, affect appropriate, oriented to time, person, place.     Skin: Warm and dry to the touch, no apparent skin lesions or masses noted.     Psychiatric: He has a normal mood and affect. His behavior is normal. Judgment and thought content normal.           ECG 12 Lead  Date/Time: 8/24/2018 9:18 AM  Performed by: CELSO MUNIZ  Authorized by: CELSO MUNIZ   Comparison: not compared with previous ECG   Rhythm: sinus rhythm              Lab Results   Component Value Date    HGB 16.0 03/06/2017    HCT 45.6 03/06/2017     No results found for: GLUCOSE, BUN, CREATININE, EGFRIFNONA, EGFRIFAFRI, BCR, CO2, CALCIUM, PROTENTOTREF, ALBUMIN, LABIL2, AST, ALT  No results found for: CHOL  No results found for: TRIG  No results found for: HDL  No components found for: LDLCALC  No results found for: LDL  No results found for:  HDLLDLRATIO  No components found for: CHOLHDL  No results found for: HGBA1C  No results found for: TSH, P8UFSCM, M4WMKPN, THYROIDAB        ASSESSMENT AND PLAN:    #1 atrial flutter status post EP study and flutter ablation #2 palpitations secondary to premature atrial complex #3 hypertension with good blood pressure control number for hyperlipidemia treated with simvastatin 10 mg.     Clinically, no evidence of congestive heart failure or active ischemia at the time of evaluation.  The patient is pleased with the clinical outcome.  He will continue metoprolol for palpitations secondary to premature atrial complex, atorvastatin for management of hyperlipidemia and baby aspirin.  The last echocardiographic study in 2011 with a normal left ventricle systolic function and no significant valvular abnormality.  We will see him back in one year R depends on patient clinical conditions we will arrange an echocardiographic study since her last echo was 2011 and check the lipid profile.  Brown was seen today for follow-up.    Diagnoses and all orders for this visit:    Typical atrial flutter (CMS/HCC)    Hyperlipidemia, unspecified hyperlipidemia type    Essential hypertension    Palpitation        Ceferino Young MD  8/24/2018  9:12 AM

## 2018-09-06 ENCOUNTER — OFFICE VISIT (OUTPATIENT)
Dept: FAMILY MEDICINE CLINIC | Facility: CLINIC | Age: 83
End: 2018-09-06

## 2018-09-06 ENCOUNTER — LAB (OUTPATIENT)
Dept: LAB | Facility: HOSPITAL | Age: 83
End: 2018-09-06

## 2018-09-06 VITALS
OXYGEN SATURATION: 98 % | BODY MASS INDEX: 24.79 KG/M2 | WEIGHT: 154.25 LBS | HEIGHT: 66 IN | SYSTOLIC BLOOD PRESSURE: 172 MMHG | DIASTOLIC BLOOD PRESSURE: 90 MMHG | HEART RATE: 93 BPM

## 2018-09-06 DIAGNOSIS — D51.0 PERNICIOUS ANEMIA: ICD-10-CM

## 2018-09-06 DIAGNOSIS — I10 ESSENTIAL HYPERTENSION: Primary | ICD-10-CM

## 2018-09-06 DIAGNOSIS — I10 ESSENTIAL HYPERTENSION: ICD-10-CM

## 2018-09-06 DIAGNOSIS — E78.5 HYPERLIPIDEMIA, UNSPECIFIED HYPERLIPIDEMIA TYPE: ICD-10-CM

## 2018-09-06 LAB
ALBUMIN SERPL-MCNC: 4.5 G/DL (ref 3.4–4.8)
ALBUMIN/GLOB SERPL: 1.1 G/DL (ref 1.1–1.8)
ALP SERPL-CCNC: 80 U/L (ref 38–126)
ALT SERPL W P-5'-P-CCNC: 32 U/L (ref 21–72)
ANION GAP SERPL CALCULATED.3IONS-SCNC: 9 MMOL/L (ref 5–15)
AST SERPL-CCNC: 54 U/L (ref 17–59)
BASOPHILS # BLD AUTO: 0.06 10*3/MM3 (ref 0–0.2)
BASOPHILS NFR BLD AUTO: 0.9 % (ref 0–2)
BILIRUB SERPL-MCNC: 1 MG/DL (ref 0.2–1.3)
BUN BLD-MCNC: 17 MG/DL (ref 7–21)
BUN/CREAT SERPL: 18.1 (ref 7–25)
CALCIUM SPEC-SCNC: 9.6 MG/DL (ref 8.4–10.2)
CHLORIDE SERPL-SCNC: 102 MMOL/L (ref 95–110)
CO2 SERPL-SCNC: 25 MMOL/L (ref 22–31)
CREAT BLD-MCNC: 0.94 MG/DL (ref 0.7–1.3)
DEPRECATED RDW RBC AUTO: 41.9 FL (ref 35.1–43.9)
EOSINOPHIL # BLD AUTO: 0.33 10*3/MM3 (ref 0–0.7)
EOSINOPHIL NFR BLD AUTO: 4.7 % (ref 0–7)
ERYTHROCYTE [DISTWIDTH] IN BLOOD BY AUTOMATED COUNT: 12.4 % (ref 11.5–14.5)
GFR SERPL CREATININE-BSD FRML MDRD: 77 ML/MIN/1.73 (ref 42–98)
GLOBULIN UR ELPH-MCNC: 4.2 GM/DL (ref 2.3–3.5)
GLUCOSE BLD-MCNC: 88 MG/DL (ref 60–100)
HCT VFR BLD AUTO: 43.8 % (ref 39–49)
HGB BLD-MCNC: 15.3 G/DL (ref 13.7–17.3)
IMM GRANULOCYTES # BLD: 0.02 10*3/MM3 (ref 0–0.02)
IMM GRANULOCYTES NFR BLD: 0.3 % (ref 0–0.5)
LYMPHOCYTES # BLD AUTO: 1.52 10*3/MM3 (ref 0.6–4.2)
LYMPHOCYTES NFR BLD AUTO: 21.9 % (ref 10–50)
MCH RBC QN AUTO: 32.4 PG (ref 26.5–34)
MCHC RBC AUTO-ENTMCNC: 34.9 G/DL (ref 31.5–36.3)
MCV RBC AUTO: 92.8 FL (ref 80–98)
MONOCYTES # BLD AUTO: 0.58 10*3/MM3 (ref 0–0.9)
MONOCYTES NFR BLD AUTO: 8.3 % (ref 0–12)
NEUTROPHILS # BLD AUTO: 4.44 10*3/MM3 (ref 2–8.6)
NEUTROPHILS NFR BLD AUTO: 63.9 % (ref 37–80)
PLATELET # BLD AUTO: 227 10*3/MM3 (ref 150–450)
PMV BLD AUTO: 9.5 FL (ref 8–12)
POTASSIUM BLD-SCNC: 5 MMOL/L (ref 3.5–5.1)
PROT SERPL-MCNC: 8.7 G/DL (ref 6.3–8.6)
RBC # BLD AUTO: 4.72 10*6/MM3 (ref 4.37–5.74)
SODIUM BLD-SCNC: 136 MMOL/L (ref 137–145)
VIT B12 BLD-MCNC: 746 PG/ML (ref 239–931)
WBC NRBC COR # BLD: 6.95 10*3/MM3 (ref 3.2–9.8)

## 2018-09-06 PROCEDURE — 99214 OFFICE O/P EST MOD 30 MIN: CPT | Performed by: FAMILY MEDICINE

## 2018-09-06 PROCEDURE — 80053 COMPREHEN METABOLIC PANEL: CPT

## 2018-09-06 PROCEDURE — 82607 VITAMIN B-12: CPT

## 2018-09-06 PROCEDURE — 36415 COLL VENOUS BLD VENIPUNCTURE: CPT

## 2018-09-06 PROCEDURE — 85025 COMPLETE CBC W/AUTO DIFF WBC: CPT

## 2018-09-06 NOTE — PROGRESS NOTES
Subjective   Brown Deng is a 82 y.o. male.   Cc: establish care  History of Present Illness The patient comes in for check of their chronic medical issues which include Hypercholesterolemia and Hypertension.He is at his base line .He denies chest pain,dyspnea or edema. He is doing well.       The following portions of the patient's history were reviewed and updated as appropriate: allergies, current medications, past family history, past medical history, past social history, past surgical history and problem list.    Review of Systems   Constitutional: Negative for fatigue and fever.   Respiratory: Negative for cough, chest tightness and stridor.    Cardiovascular: Negative for chest pain, palpitations and leg swelling.       Objective   Physical Exam   Constitutional: He appears well-developed and well-nourished.   HENT:   Head: Normocephalic and atraumatic.   Right Ear: External ear normal.   Left Ear: External ear normal.   Nose: Nose normal.   Mouth/Throat: Oropharynx is clear and moist.   Eyes: Pupils are equal, round, and reactive to light.   Neck: Normal range of motion.   Cardiovascular: Normal rate, regular rhythm and normal heart sounds.  Exam reveals no gallop and no friction rub.    No murmur heard.  Pulmonary/Chest: Effort normal and breath sounds normal. No respiratory distress. He has no wheezes. He has no rales.   Abdominal: Soft. Bowel sounds are normal. He exhibits no distension. There is no tenderness. There is no guarding.   Skin: Skin is warm and dry.   Vitals reviewed.        Assessment/Plan   Brown was seen today for establish care.    Diagnoses and all orders for this visit:    Essential hypertension  -     Comprehensive metabolic panel; Future  -     CBC w AUTO Differential; Future    Hyperlipidemia, unspecified hyperlipidemia type    Pernicious anemia  -     Vitamin B12; Future          Return to the clinic in 3 months.  Will contact with results as needed.

## 2018-10-02 ENCOUNTER — FLU SHOT (OUTPATIENT)
Dept: FAMILY MEDICINE CLINIC | Facility: CLINIC | Age: 83
End: 2018-10-02

## 2018-10-02 DIAGNOSIS — Z23 FLU VACCINE NEED: Primary | ICD-10-CM

## 2018-10-02 PROCEDURE — G0008 ADMIN INFLUENZA VIRUS VAC: HCPCS | Performed by: FAMILY MEDICINE

## 2018-10-02 PROCEDURE — 90662 IIV NO PRSV INCREASED AG IM: CPT | Performed by: FAMILY MEDICINE

## 2018-10-15 ENCOUNTER — DOCUMENTATION (OUTPATIENT)
Dept: CARDIOLOGY | Facility: CLINIC | Age: 83
End: 2018-10-15

## 2018-10-17 ENCOUNTER — APPOINTMENT (OUTPATIENT)
Dept: LAB | Facility: HOSPITAL | Age: 83
End: 2018-10-17

## 2018-10-17 LAB
ARTICHOKE IGE QN: 97 MG/DL (ref 1–129)
CHOLEST SERPL-MCNC: 174 MG/DL (ref 0–199)
HDLC SERPL-MCNC: 49 MG/DL (ref 60–200)
LDLC/HDLC SERPL: 2.13 {RATIO} (ref 0–3.55)
TRIGL SERPL-MCNC: 103 MG/DL (ref 20–199)

## 2018-10-17 PROCEDURE — 80061 LIPID PANEL: CPT | Performed by: INTERNAL MEDICINE

## 2018-10-17 PROCEDURE — 36415 COLL VENOUS BLD VENIPUNCTURE: CPT | Performed by: INTERNAL MEDICINE

## 2018-11-19 RX ORDER — ATORVASTATIN CALCIUM 10 MG/1
10 TABLET, FILM COATED ORAL DAILY
Qty: 90 TABLET | Refills: 2 | Status: SHIPPED | OUTPATIENT
Start: 2018-11-19 | End: 2019-01-01 | Stop reason: SDUPTHER

## 2018-11-19 RX ORDER — METOPROLOL SUCCINATE 50 MG/1
50 TABLET, EXTENDED RELEASE ORAL DAILY
Qty: 90 TABLET | Refills: 2 | Status: SHIPPED | OUTPATIENT
Start: 2018-11-19 | End: 2019-01-01 | Stop reason: SDUPTHER

## 2018-12-06 ENCOUNTER — OFFICE VISIT (OUTPATIENT)
Dept: FAMILY MEDICINE CLINIC | Facility: CLINIC | Age: 83
End: 2018-12-06

## 2018-12-06 VITALS
BODY MASS INDEX: 25.25 KG/M2 | DIASTOLIC BLOOD PRESSURE: 82 MMHG | WEIGHT: 157.13 LBS | HEIGHT: 66 IN | OXYGEN SATURATION: 98 % | SYSTOLIC BLOOD PRESSURE: 148 MMHG | HEART RATE: 78 BPM

## 2018-12-06 DIAGNOSIS — I10 ESSENTIAL HYPERTENSION: Primary | ICD-10-CM

## 2018-12-06 DIAGNOSIS — E78.5 HYPERLIPIDEMIA, UNSPECIFIED HYPERLIPIDEMIA TYPE: ICD-10-CM

## 2018-12-06 PROCEDURE — 99213 OFFICE O/P EST LOW 20 MIN: CPT | Performed by: FAMILY MEDICINE

## 2018-12-06 NOTE — PROGRESS NOTES
Subjective   Brown Deng is a 83 y.o. male.   Cc: follow up  History of Present Illness The patient comes in for check of their chronic medical issues which include Hypertension and Hyperlipidemia. He is doing well. .       The following portions of the patient's history were reviewed and updated as appropriate: allergies, current medications, past family history, past medical history, past social history, past surgical history and problem list.    Review of Systems   Constitutional: Negative for fatigue and fever.   Respiratory: Negative for cough and chest tightness.    Cardiovascular: Negative for chest pain, palpitations and leg swelling.       Objective   Physical Exam   Constitutional: He appears well-developed and well-nourished.   HENT:   Head: Normocephalic and atraumatic.   Right Ear: External ear normal.   Left Ear: External ear normal.   Nose: Nose normal.   Mouth/Throat: Oropharynx is clear and moist.   Eyes: Pupils are equal, round, and reactive to light.   Neck: Normal range of motion.   Cardiovascular: Normal rate, regular rhythm and normal heart sounds. Exam reveals no gallop and no friction rub.   No murmur heard.  Pulmonary/Chest: Effort normal and breath sounds normal. No stridor. No respiratory distress. He has no wheezes. He has no rales.   Abdominal: Soft. Bowel sounds are normal. He exhibits no distension. There is no tenderness. There is no guarding.   Skin: Skin is warm and dry.   Vitals reviewed.        Assessment/Plan   Brown was seen today for follow-up and hypertension.    Diagnoses and all orders for this visit:    Essential hypertension  -     Comprehensive metabolic panel; Future  -     CBC w AUTO Differential; Future    Hyperlipidemia, unspecified hyperlipidemia type  -     Lipid panel; Future          Return to the clinic in 6 month/s.  Will contact with results as needed.

## 2019-01-01 ENCOUNTER — OFFICE VISIT (OUTPATIENT)
Dept: OTOLARYNGOLOGY | Facility: CLINIC | Age: 84
End: 2019-01-01

## 2019-01-01 ENCOUNTER — OFFICE VISIT (OUTPATIENT)
Dept: FAMILY MEDICINE CLINIC | Facility: CLINIC | Age: 84
End: 2019-01-01

## 2019-01-01 VITALS — WEIGHT: 147.6 LBS | HEIGHT: 66 IN | OXYGEN SATURATION: 99 % | BODY MASS INDEX: 23.72 KG/M2

## 2019-01-01 VITALS
WEIGHT: 148.19 LBS | HEIGHT: 66 IN | BODY MASS INDEX: 23.82 KG/M2 | HEART RATE: 61 BPM | OXYGEN SATURATION: 99 % | SYSTOLIC BLOOD PRESSURE: 126 MMHG | DIASTOLIC BLOOD PRESSURE: 70 MMHG

## 2019-01-01 DIAGNOSIS — H61.23 BILATERAL IMPACTED CERUMEN: Primary | ICD-10-CM

## 2019-01-01 DIAGNOSIS — E78.5 HYPERLIPIDEMIA, UNSPECIFIED HYPERLIPIDEMIA TYPE: Primary | ICD-10-CM

## 2019-01-01 DIAGNOSIS — I10 ESSENTIAL HYPERTENSION: ICD-10-CM

## 2019-01-01 PROCEDURE — 99213 OFFICE O/P EST LOW 20 MIN: CPT | Performed by: FAMILY MEDICINE

## 2019-01-01 PROCEDURE — 69210 REMOVE IMPACTED EAR WAX UNI: CPT | Performed by: OTOLARYNGOLOGY

## 2019-01-01 RX ORDER — METOPROLOL SUCCINATE 50 MG/1
50 TABLET, EXTENDED RELEASE ORAL DAILY
Qty: 90 TABLET | Refills: 2 | Status: SHIPPED | OUTPATIENT
Start: 2019-01-01

## 2019-01-01 RX ORDER — ATORVASTATIN CALCIUM 10 MG/1
10 TABLET, FILM COATED ORAL DAILY
Qty: 90 TABLET | Refills: 2 | Status: SHIPPED | OUTPATIENT
Start: 2019-01-01

## 2019-02-26 ENCOUNTER — OFFICE VISIT (OUTPATIENT)
Dept: CARDIOLOGY | Facility: CLINIC | Age: 84
End: 2019-02-26

## 2019-02-26 VITALS
HEART RATE: 74 BPM | DIASTOLIC BLOOD PRESSURE: 80 MMHG | OXYGEN SATURATION: 97 % | SYSTOLIC BLOOD PRESSURE: 120 MMHG | BODY MASS INDEX: 25.23 KG/M2 | WEIGHT: 157 LBS | HEIGHT: 66 IN

## 2019-02-26 DIAGNOSIS — R00.2 PALPITATION: ICD-10-CM

## 2019-02-26 DIAGNOSIS — I10 ESSENTIAL HYPERTENSION: ICD-10-CM

## 2019-02-26 DIAGNOSIS — E78.5 HYPERLIPIDEMIA, UNSPECIFIED HYPERLIPIDEMIA TYPE: ICD-10-CM

## 2019-02-26 DIAGNOSIS — I48.3 TYPICAL ATRIAL FLUTTER (HCC): Primary | ICD-10-CM

## 2019-02-26 PROCEDURE — 99213 OFFICE O/P EST LOW 20 MIN: CPT | Performed by: INTERNAL MEDICINE

## 2019-02-26 NOTE — PROGRESS NOTES
Brown Deng  83 y.o. male    02/26/2019  1. Typical atrial flutter (CMS/HCC)    2. Hyperlipidemia, unspecified hyperlipidemia type    3. Essential hypertension    4. Palpitation        History of Present Illness:  Patient's Body mass index is 25.34 kg/m². BMI is within normal parameters. No follow-up required..    82 years old patient to had to medical history significant for palpitations, documented atrial flutter with counterclockwise mechanism underwent EP study and flutter ablation with bidirectional block.  The patient noted to have palpitation secondary to premature supraventricular ectopic beat.  Started on metoprolol without  further recurrence of palpitation.  He is  pleased with the clinical outcome.  He denies orthopnea, PND, nauseous, vomiting.  Denies any polydipsia polyuria.  Denies any fever cough or chills.     ECHO 8/4/2018  · Left ventricular wall thickness is consistent with mild concentric hypertrophy.  · Left ventricular systolic function is normal. Estimated EF = 57%.  · Left ventricular diastolic dysfunction (grade I) consistent with impaired relaxation.  · Mild aortic valve regurgitation is present.  · Mild mitral valve regurgitation is present  · Mild tricuspid valve regurgitation is present.    SUBJECTIVE:    No Known Allergies      Past Medical History:   Diagnosis Date   • Craggy prostate    • Heart trouble    • Pernicious anemia    • Rectal hemorrhage    • Screening for malignant neoplasm of prostate          Past Surgical History:   Procedure Laterality Date   • CARDIAC ABLATION     • COLONOSCOPY  01/14/2011   • COLONOSCOPY  11/18/2016   • UPPER GASTROINTESTINAL ENDOSCOPY  01/14/2011         Family History   Problem Relation Age of Onset   • Heart disease Other    • Ulcers Other    • Cancer Other    • Cholelithiasis Other    • Hypertension Other          Social History     Socioeconomic History   • Marital status:      Spouse name: Not on file   • Number of children: Not on  file   • Years of education: Not on file   • Highest education level: Not on file   Social Needs   • Financial resource strain: Not on file   • Food insecurity - worry: Not on file   • Food insecurity - inability: Not on file   • Transportation needs - medical: Not on file   • Transportation needs - non-medical: Not on file   Occupational History   • Not on file   Tobacco Use   • Smoking status: Former Smoker     Packs/day: 1.50     Years: 45.00     Pack years: 67.50     Types: Cigarettes     Start date:      Last attempt to quit: 1995     Years since quittin.4   • Smokeless tobacco: Never Used   Substance and Sexual Activity   • Alcohol use: No   • Drug use: No   • Sexual activity: Defer   Other Topics Concern   • Not on file   Social History Narrative   • Not on file         Current Outpatient Medications   Medication Sig Dispense Refill   • aspirin 81 MG EC tablet Take 81 mg by mouth Daily.     • atorvastatin (LIPITOR) 10 MG tablet TAKE 1 TABLET BY MOUTH  DAILY 90 tablet 2   • metoprolol succinate XL (TOPROL-XL) 50 MG 24 hr tablet TAKE 1 TABLET BY MOUTH  DAILY 90 tablet 2     No current facility-administered medications for this visit.            Review of Systems:     Constitutional:  Denies recent weight loss, weight gain, fever or chills, no change in exercise tolerance.     HENT:  Denies any hearing loss, epistaxis, hoarseness, or difficulty speaking.     Eyes: Wears eyeglasses or contact lenses.    Respiratory:  Denies dyspnea with exertion,no cough, wheezing, or hemoptysis.     Cardiovascular: See H&P    Gastrointestinal:  Denies change in bowel habits, dyspepsia, ulcer disease, hematochezia, or melena.     Endocrine: Negative for cold intolerance, heat intolerance, polydipsia, polyphagia and polyuria. Denies any history of weight change, polydipsia, polyuria.     Genitourinary: Negative.      Musculoskeletal: Denies any history of arthritic symptoms or back problems.     Skin:  Denies any  "change in hair or nails, rashes, or skin lesions.     Allergic/Immunologic: Negative.  Negative for environmental allergies, food allergies and immunocompromised state.     Neurological:  Denies any history of recurrent headaches, strokes, TIA, or seizure disorder.     Hematological: Denies any food allergies, seasonal allergies, bleeding disorders, or lymphadenopathy.     Psychiatric/Behavioral: Denies any history of depression, substance abuse, or change in cognitive function.       OBJECTIVE:    /80   Pulse 74   Ht 167.6 cm (66\")   Wt 71.2 kg (157 lb)   SpO2 97%   BMI 25.34 kg/m²       Physical Exam:     Constitutional: Cooperative, alert and oriented, well-developed, well-nourished, in no acute distress.     HENT:   Head: Normocephalic, normal hair patterns, no masses or tenderness.  Ears, Nose, and Throat: No gross abnormalities. No pallor or cyanosis. Dentition good.   Eyes: EOMS intact, PERRL, conjunctivae and lids unremarkable. Fundoscopic exam and visual fields not performed.   Neck: No palpable masses or adenopathy, no thyromegaly, no JVD, carotid pulses are full and equal bilaterally and without  Bruits.     Cardiovascular: Regular rhythm, S1 and S2 normal, no S3 or S4. Apical impulse not displaced. No murmurs, gallops, or rubs detected.     Pulmonary/Chest: Chest: normal symmetry, no tenderness to palpation, normal respiratory excursion, no intercostal retraction, no use of accessory muscles. Pulmonary: Normal breath sounds. No rales or rhonchi.    Abdominal: Abdomen soft, bowel sounds normoactive, no masses, no hepatosplenomegaly, non-tender, no bruits.     Musculoskeletal: No deformities, clubbing, cyanosis, erythema, or edema observed. There are no spinal abnormalities noted. Normal muscle strength and tone. Pulses full and equal in all extremities, no bruits auscultated.     Neurological: No gross motor or sensory deficits noted, affect appropriate, oriented to time, person, place. "     Skin: Warm and dry to the touch, no apparent skin lesions or masses noted.     Psychiatric: He has a normal mood and affect. His behavior is normal. Judgment and thought content normal.         Procedures      Lab Results   Component Value Date    WBC 6.95 09/06/2018    HGB 15.3 09/06/2018    HCT 43.8 09/06/2018    MCV 92.8 09/06/2018     09/06/2018     Lab Results   Component Value Date    GLUCOSE 88 09/06/2018    BUN 17 09/06/2018    CREATININE 0.94 09/06/2018    EGFRIFNONA 77 09/06/2018    BCR 18.1 09/06/2018    CO2 25.0 09/06/2018    CALCIUM 9.6 09/06/2018    ALBUMIN 4.50 09/06/2018    AST 54 09/06/2018    ALT 32 09/06/2018     Lab Results   Component Value Date    CHOL 174 10/17/2018     Lab Results   Component Value Date    TRIG 103 10/17/2018     Lab Results   Component Value Date    HDL 49 (L) 10/17/2018     No components found for: LDLCALC  Lab Results   Component Value Date    LDL 97 10/17/2018     No results found for: HDLLDLRATIO  No components found for: CHOLHDL  No results found for: HGBA1C  No results found for: TSH, X0GVGVD, P0ANFRL, THYROIDAB        ASSESSMENT AND PLAN:  #1 atrial flutter status post EP study and flutter ablation #2 palpitations secondary to premature atrial complex #3 hypertension with good blood pressure control number for hyperlipidemia treated with simvastatin 10 mg.     Clinically, no evidence of congestive heart failure or active ischemia at the time of evaluation.  The patient is pleased with the clinical outcome.  He will continue metoprolol for palpitations secondary to premature atrial complex, atorvastatin for management of hyperlipidemia and baby aspirin.    We will see him back in one year R depends on patient clinical conditions         Brown was seen today for follow-up.    Diagnoses and all orders for this visit:    Typical atrial flutter (CMS/HCC)    Hyperlipidemia, unspecified hyperlipidemia type    Essential hypertension    Palpitation        Ceferino  MD Hector  2/26/2019  9:53 AM

## 2019-06-06 PROBLEM — I10 ESSENTIAL HYPERTENSION: Status: RESOLVED | Noted: 2017-08-25 | Resolved: 2019-01-01

## 2019-06-06 NOTE — PROGRESS NOTES
Subjective   Brown Deng is a 83 y.o. male.    cc:follow up  History of Present Illness The patient comes in for check of their chronic medical issues which include Hypertension and Hyperlipidemia.He is at his base line.   .       The following portions of the patient's history were reviewed and updated as appropriate: allergies, current medications, past family history, past medical history, past social history, past surgical history and problem list.    Review of Systems   Constitutional: Negative for fatigue and fever.   Respiratory: Negative for cough, chest tightness and stridor.    Cardiovascular: Negative for chest pain, palpitations and leg swelling.       Objective   Physical Exam   Constitutional: He is oriented to person, place, and time. He appears well-developed and well-nourished.   HENT:   Head: Normocephalic and atraumatic.   Right Ear: External ear normal.   Left Ear: External ear normal.   Nose: Nose normal.   Mouth/Throat: Oropharynx is clear and moist.   Eyes: Conjunctivae are normal.   Neck: Normal range of motion.   Cardiovascular: Normal rate, regular rhythm and normal heart sounds. Exam reveals no gallop and no friction rub.   No murmur heard.  Pulmonary/Chest: Effort normal and breath sounds normal. No stridor. No respiratory distress. He has no wheezes. He has no rales.   Abdominal: Soft. Bowel sounds are normal. He exhibits no distension and no mass. There is no tenderness. There is no guarding.   Musculoskeletal: Normal range of motion.   Back is non tender.   Neurological: He is alert and oriented to person, place, and time.   Skin: Skin is warm and dry.   Vitals reviewed.        Assessment/Plan   Brown was seen today for follow-up.    Diagnoses and all orders for this visit:    Hyperlipidemia, unspecified hyperlipidemia type  -     Lipid panel; Future    Essential hypertension  -     Comprehensive metabolic panel; Future  -     CBC w AUTO Differential; Future          Return to  the clinic in 6 month/s.  Will contact with results as needed.

## 2019-08-15 NOTE — PROGRESS NOTES
Subjective   Brown Deng is a 83 y.o. male.       History of Present Illness   Patient was seen a year ago with bilateral cerumen impactions and I had recommended he return in a year.  He says his ears been itching but not draining.      The following portions of the patient's history were reviewed and updated as appropriate: allergies, current medications, past family history, past medical history, past social history, past surgical history and problem list.     reports that he quit smoking about 23 years ago. His smoking use included cigarettes. He started smoking about 69 years ago. He has a 67.50 pack-year smoking history. He has never used smokeless tobacco. He reports that he does not drink alcohol or use drugs.   Patient is not a tobacco user and has not been counseled for use of tobacco products      Review of Systems        Objective   Physical Exam  Both ear canals show cerumen impactions  Using the binocular microscope for visualization, cerumen impaction was removed from bilateral ear canal(s) using instrumentation. This was personally performed by Yo Castro MD  Following cerumen removal tympanic membranes are noted be intact with no infection or effusion    Assessment/Plan   Brown was seen today for follow-up.    Diagnoses and all orders for this visit:    Bilateral impacted cerumen        Plan: Cerumen removed as described above.  Return in 1 year, call sooner for problems.

## 2020-01-01 ENCOUNTER — OFFICE VISIT (OUTPATIENT)
Dept: CARDIOLOGY | Facility: CLINIC | Age: 85
End: 2020-01-01

## 2020-01-01 ENCOUNTER — LAB (OUTPATIENT)
Dept: LAB | Facility: HOSPITAL | Age: 85
End: 2020-01-01

## 2020-01-01 ENCOUNTER — OFFICE VISIT (OUTPATIENT)
Dept: FAMILY MEDICINE CLINIC | Facility: CLINIC | Age: 85
End: 2020-01-01

## 2020-01-01 VITALS
OXYGEN SATURATION: 97 % | WEIGHT: 155.56 LBS | SYSTOLIC BLOOD PRESSURE: 130 MMHG | DIASTOLIC BLOOD PRESSURE: 76 MMHG | HEART RATE: 118 BPM | BODY MASS INDEX: 25 KG/M2 | HEIGHT: 66 IN

## 2020-01-01 VITALS
HEART RATE: 70 BPM | BODY MASS INDEX: 25.07 KG/M2 | HEIGHT: 66 IN | SYSTOLIC BLOOD PRESSURE: 128 MMHG | DIASTOLIC BLOOD PRESSURE: 84 MMHG | WEIGHT: 156 LBS | OXYGEN SATURATION: 99 %

## 2020-01-01 DIAGNOSIS — Z86.79 HISTORY OF ATRIAL FIBRILLATION: ICD-10-CM

## 2020-01-01 DIAGNOSIS — I48.3 TYPICAL ATRIAL FLUTTER (HCC): Primary | ICD-10-CM

## 2020-01-01 DIAGNOSIS — E78.5 HYPERLIPIDEMIA, UNSPECIFIED HYPERLIPIDEMIA TYPE: ICD-10-CM

## 2020-01-01 DIAGNOSIS — R00.2 PALPITATION: ICD-10-CM

## 2020-01-01 DIAGNOSIS — I10 ESSENTIAL HYPERTENSION: ICD-10-CM

## 2020-01-01 DIAGNOSIS — E78.5 HYPERLIPIDEMIA, UNSPECIFIED HYPERLIPIDEMIA TYPE: Primary | ICD-10-CM

## 2020-01-01 LAB
ALBUMIN SERPL-MCNC: 4.8 G/DL (ref 3.5–5.2)
ALBUMIN/GLOB SERPL: 1.3 G/DL
ALP SERPL-CCNC: 58 U/L (ref 39–117)
ALT SERPL W P-5'-P-CCNC: 14 U/L (ref 1–41)
ANION GAP SERPL CALCULATED.3IONS-SCNC: 17.8 MMOL/L (ref 5–15)
AST SERPL-CCNC: 23 U/L (ref 1–40)
BASOPHILS # BLD AUTO: 0.11 10*3/MM3 (ref 0–0.2)
BASOPHILS NFR BLD AUTO: 1.5 % (ref 0–1.5)
BILIRUB SERPL-MCNC: 0.8 MG/DL (ref 0.2–1.2)
BUN BLD-MCNC: 12 MG/DL (ref 8–23)
BUN/CREAT SERPL: 12.9 (ref 7–25)
CALCIUM SPEC-SCNC: 9.9 MG/DL (ref 8.6–10.5)
CHLORIDE SERPL-SCNC: 99 MMOL/L (ref 98–107)
CHOLEST SERPL-MCNC: 174 MG/DL (ref 0–200)
CO2 SERPL-SCNC: 21.2 MMOL/L (ref 22–29)
CREAT BLD-MCNC: 0.93 MG/DL (ref 0.76–1.27)
DEPRECATED RDW RBC AUTO: 41.2 FL (ref 37–54)
EOSINOPHIL # BLD AUTO: 0.21 10*3/MM3 (ref 0–0.4)
EOSINOPHIL NFR BLD AUTO: 2.8 % (ref 0.3–6.2)
ERYTHROCYTE [DISTWIDTH] IN BLOOD BY AUTOMATED COUNT: 12.6 % (ref 12.3–15.4)
GFR SERPL CREATININE-BSD FRML MDRD: 77 ML/MIN/1.73
GLOBULIN UR ELPH-MCNC: 3.6 GM/DL
GLUCOSE BLD-MCNC: 86 MG/DL (ref 65–99)
HCT VFR BLD AUTO: 44.4 % (ref 37.5–51)
HDLC SERPL-MCNC: 62 MG/DL (ref 40–60)
HGB BLD-MCNC: 15.8 G/DL (ref 13–17.7)
IMM GRANULOCYTES # BLD AUTO: 0.02 10*3/MM3 (ref 0–0.05)
IMM GRANULOCYTES NFR BLD AUTO: 0.3 % (ref 0–0.5)
LDLC SERPL CALC-MCNC: 93 MG/DL (ref 0–100)
LDLC/HDLC SERPL: 1.51 {RATIO}
LYMPHOCYTES # BLD AUTO: 1.11 10*3/MM3 (ref 0.7–3.1)
LYMPHOCYTES NFR BLD AUTO: 15.1 % (ref 19.6–45.3)
MCH RBC QN AUTO: 32.6 PG (ref 26.6–33)
MCHC RBC AUTO-ENTMCNC: 35.6 G/DL (ref 31.5–35.7)
MCV RBC AUTO: 91.5 FL (ref 79–97)
MONOCYTES # BLD AUTO: 0.46 10*3/MM3 (ref 0.1–0.9)
MONOCYTES NFR BLD AUTO: 6.2 % (ref 5–12)
NEUTROPHILS # BLD AUTO: 5.46 10*3/MM3 (ref 1.7–7)
NEUTROPHILS NFR BLD AUTO: 74.1 % (ref 42.7–76)
NRBC BLD AUTO-RTO: 0 /100 WBC (ref 0–0.2)
PLATELET # BLD AUTO: 261 10*3/MM3 (ref 140–450)
PMV BLD AUTO: 10.8 FL (ref 6–12)
POTASSIUM BLD-SCNC: 4.3 MMOL/L (ref 3.5–5.2)
PROT SERPL-MCNC: 8.4 G/DL (ref 6–8.5)
RBC # BLD AUTO: 4.85 10*6/MM3 (ref 4.14–5.8)
SODIUM BLD-SCNC: 138 MMOL/L (ref 136–145)
TRIGL SERPL-MCNC: 93 MG/DL (ref 0–150)
VLDLC SERPL-MCNC: 18.6 MG/DL (ref 5–40)
WBC NRBC COR # BLD: 7.37 10*3/MM3 (ref 3.4–10.8)

## 2020-01-01 PROCEDURE — 93000 ELECTROCARDIOGRAM COMPLETE: CPT | Performed by: INTERNAL MEDICINE

## 2020-01-01 PROCEDURE — 85025 COMPLETE CBC W/AUTO DIFF WBC: CPT

## 2020-01-01 PROCEDURE — 80053 COMPREHEN METABOLIC PANEL: CPT

## 2020-01-01 PROCEDURE — 99214 OFFICE O/P EST MOD 30 MIN: CPT | Performed by: FAMILY MEDICINE

## 2020-01-01 PROCEDURE — 80061 LIPID PANEL: CPT

## 2020-01-01 PROCEDURE — 99213 OFFICE O/P EST LOW 20 MIN: CPT | Performed by: INTERNAL MEDICINE

## 2020-01-01 PROCEDURE — 36415 COLL VENOUS BLD VENIPUNCTURE: CPT

## 2020-02-06 NOTE — PROGRESS NOTES
Subjective   Brown Deng is a 84 y.o. male.   Cc: follow up of chronic medical issues  Hypertension   This is a chronic problem. The current episode started more than 1 year ago. The problem is unchanged. The problem is controlled. Pertinent negatives include no anxiety, chest pain, palpitations, peripheral edema, shortness of breath or sweats. There are no associated agents to hypertension. Risk factors for coronary artery disease include dyslipidemia and hyperhomocysteinemia. Current antihypertension treatment includes beta blockers.   Hyperlipidemia   This is a chronic problem. The current episode started more than 1 year ago. There are no known factors aggravating his hyperlipidemia. Pertinent negatives include no chest pain or shortness of breath. Current antihyperlipidemic treatment includes statins. The current treatment provides no improvement of lipids.   The patient comes in for check of his Atrial Fibrillation. This as been stable.    The following portions of the patient's history were reviewed and updated as appropriate: allergies, current medications, past family history, past medical history, past social history, past surgical history and problem list.    Review of Systems   Constitutional: Negative for fatigue and fever.   Respiratory: Negative for cough, chest tightness and shortness of breath.    Cardiovascular: Negative for chest pain, palpitations and leg swelling.       Objective   Physical Exam   Constitutional: He is oriented to person, place, and time. He appears well-developed and well-nourished.   HENT:   Head: Normocephalic and atraumatic.   Right Ear: External ear normal.   Left Ear: External ear normal.   Nose: Nose normal.   Mouth/Throat: Oropharynx is clear and moist.   Eyes: Conjunctivae are normal.   Neck: Normal range of motion.   Cardiovascular: Normal rate, regular rhythm and normal heart sounds. Exam reveals no gallop and no friction rub.   No murmur  "heard.  Pulmonary/Chest: Effort normal and breath sounds normal. No stridor. No respiratory distress. He has no wheezes. He has no rales.   Abdominal: Soft. Bowel sounds are normal. He exhibits no distension. There is no tenderness. There is no guarding.   Neurological: He is alert and oriented to person, place, and time.   Skin: Skin is warm and dry.   Vitals reviewed.        Visit Vitals  /76   Pulse 118   Ht 167.6 cm (66\")   Wt 70.6 kg (155 lb 9 oz)   SpO2 97%   BMI 25.11 kg/m²     Body mass index is 25.11 kg/m².      Assessment/Plan   Brown was seen today for follow-up, hypertension and hyperlipidemia.    Diagnoses and all orders for this visit:    Hyperlipidemia, unspecified hyperlipidemia type  -     Lipid panel; Future    Essential hypertension  -     Comprehensive metabolic panel; Future  -     CBC w AUTO Differential; Future    History of atrial fibrillation    Return to the clinic in 3 month/s.  Will contact with results as needed.    "

## 2020-03-16 ENCOUNTER — TELEPHONE (OUTPATIENT)
Dept: FAMILY MEDICINE CLINIC | Facility: CLINIC | Age: 85
End: 2020-03-16

## 2024-06-01 NOTE — PROGRESS NOTES
Brown Deng  84 y.o. male    02/26/2019  1. Typical atrial flutter (CMS/HCC)    2. Hyperlipidemia, unspecified hyperlipidemia type    3. Palpitation        History of Present Illness:    Patient's Body mass index is 25.18 kg/m². BMI is within normal parameters. No follow-up required..    82 years old patient with background medical history significant for palpitations, documented atrial flutter with counterclockwise mechanism underwent EP study and flutter ablation with bidirectional block.  No further recurrence of tachycardia but have intermittent palpitations secondary to premature atrial complex responded well to metoprolol .  He is  pleased with the clinical outcome.  He denies orthopnea, PND, nauseous, vomiting.  Denies any polydipsia polyuria.  Denies any fever cough or chills.     ECHO 8/4/2018    · Left ventricular wall thickness is consistent with mild concentric hypertrophy.  · Left ventricular systolic function is normal. Estimated EF = 57%.  · Left ventricular diastolic dysfunction (grade I) consistent with impaired relaxation.  · Mild aortic valve regurgitation is present.  · Mild mitral valve regurgitation is present  · Mild tricuspid valve regurgitation is present.    2/6/2019  Total Cholesterol  0 - 200 mg/dL 174    Triglycerides  0 - 150 mg/dL 93    HDL Cholesterol  40 - 60 mg/dL 62High     LDL Cholesterol   0 - 100 mg/dL 93    VLDL Cholesterol  5 - 40 mg/dL 18.6    LDL/HDL Ratio 1.51          SUBJECTIVE:    No Known Allergies      Past Medical History:   Diagnosis Date   • Craggy prostate    • Heart trouble    • Pernicious anemia    • Rectal hemorrhage    • Screening for malignant neoplasm of prostate          Past Surgical History:   Procedure Laterality Date   • CARDIAC ABLATION     • COLONOSCOPY  01/14/2011   • COLONOSCOPY  11/18/2016   • UPPER GASTROINTESTINAL ENDOSCOPY  01/14/2011         Family History   Problem Relation Age of Onset   • Heart disease Other    • Ulcers Other    •  Cancer Other    • Cholelithiasis Other    • Hypertension Other          Social History     Socioeconomic History   • Marital status:      Spouse name: Not on file   • Number of children: Not on file   • Years of education: Not on file   • Highest education level: Not on file   Tobacco Use   • Smoking status: Former Smoker     Packs/day: 1.50     Years: 45.00     Pack years: 67.50     Types: Cigarettes     Start date:      Last attempt to quit: 1995     Years since quittin.4   • Smokeless tobacco: Never Used   Substance and Sexual Activity   • Alcohol use: No   • Drug use: No   • Sexual activity: Defer         Current Outpatient Medications   Medication Sig Dispense Refill   • aspirin 81 MG EC tablet Take 81 mg by mouth Daily.     • atorvastatin (LIPITOR) 10 MG tablet Take 1 tablet by mouth Daily. 90 tablet 2   • metoprolol succinate XL (TOPROL-XL) 50 MG 24 hr tablet Take 1 tablet by mouth Daily. 90 tablet 2     No current facility-administered medications for this visit.            Review of Systems:     Constitutional:  Denies recent weight loss, weight gain, fever or chills, no change in exercise tolerance.     HENT:  Denies any hearing loss, epistaxis, hoarseness, or difficulty speaking.     Eyes: Wears eyeglasses or contact lenses.    Respiratory:  Denies dyspnea with exertion,no cough, wheezing, or hemoptysis.     Cardiovascular: See H&P    Gastrointestinal:  Denies change in bowel habits, dyspepsia, ulcer disease, hematochezia, or melena.     Endocrine: Negative for cold intolerance, heat intolerance, polydipsia, polyphagia and polyuria. Denies any history of weight change, polydipsia, polyuria.     Genitourinary: Negative.      Musculoskeletal: Denies any history of arthritic symptoms or back problems.     Skin:  Denies any change in hair or nails, rashes, or skin lesions.     Allergic/Immunologic: Negative.  Negative for environmental allergies, food allergies and immunocompromised state.  "    Neurological:  Denies any history of recurrent headaches, strokes, TIA, or seizure disorder.     Hematological: Denies any food allergies, seasonal allergies, bleeding disorders, or lymphadenopathy.     Psychiatric/Behavioral: Denies any history of depression, substance abuse, or change in cognitive function.       OBJECTIVE:    /84   Pulse 70   Ht 167.6 cm (66\")   Wt 70.8 kg (156 lb)   SpO2 99%   BMI 25.18 kg/m²       Physical Exam:     Constitutional: Cooperative, alert and oriented, well-developed, well-nourished, in no acute distress.     HENT:   Head: Normocephalic, normal hair patterns, no masses or tenderness.  Ears, Nose, and Throat: No gross abnormalities. No pallor or cyanosis. Dentition good.   Eyes: EOMS intact, PERRL, conjunctivae and lids unremarkable. Fundoscopic exam and visual fields not performed.   Neck: No palpable masses or adenopathy, no thyromegaly, no JVD, carotid pulses are full and equal bilaterally and without  Bruits.     Cardiovascular: Regular rhythm, S1 and S2 normal, no S3 or S4. Apical impulse not displaced. No murmurs, gallops, or rubs detected.     Pulmonary/Chest: Chest: normal symmetry, no tenderness to palpation, normal respiratory excursion, no intercostal retraction, no use of accessory muscles. Pulmonary: Normal breath sounds. No rales or rhonchi.    Abdominal: Abdomen soft, bowel sounds normoactive, no masses, no hepatosplenomegaly, non-tender, no bruits.     Musculoskeletal: No deformities, clubbing, cyanosis, erythema, or edema observed. There are no spinal abnormalities noted. Normal muscle strength and tone. Pulses full and equal in all extremities, no bruits auscultated.     Neurological: No gross motor or sensory deficits noted, affect appropriate, oriented to time, person, place.     Skin: Warm and dry to the touch, no apparent skin lesions or masses noted.     Psychiatric: He has a normal mood and affect. His behavior is normal. Judgment and thought " content normal.         Procedures      Lab Results   Component Value Date    WBC 7.37 02/06/2020    HGB 15.8 02/06/2020    HCT 44.4 02/06/2020    MCV 91.5 02/06/2020     02/06/2020     Lab Results   Component Value Date    GLUCOSE 86 02/06/2020    BUN 12 02/06/2020    CREATININE 0.93 02/06/2020    EGFRIFNONA 77 02/06/2020    BCR 12.9 02/06/2020    CO2 21.2 (L) 02/06/2020    CALCIUM 9.9 02/06/2020    ALBUMIN 4.80 02/06/2020    AST 23 02/06/2020    ALT 14 02/06/2020     Lab Results   Component Value Date    CHOL 174 02/06/2020    CHOL 174 10/17/2018     Lab Results   Component Value Date    TRIG 93 02/06/2020    TRIG 103 10/17/2018     Lab Results   Component Value Date    HDL 62 (H) 02/06/2020    HDL 49 (L) 10/17/2018     No components found for: LDLCALC  Lab Results   Component Value Date    LDL 93 02/06/2020    LDL 97 10/17/2018     No results found for: HDLLDLRATIO  No components found for: CHOLHDL  No results found for: HGBA1C  No results found for: TSH, G6PQJKQ, U3NHBPP, THYROIDAB        ASSESSMENT AND PLAN:  #1 atrial flutter status post EP study and flutter ablation   #2 palpitations secondary to premature atrial complex metoprolol no further recurrence of PACs or palpitation   #3 hypertension with good blood pressure control on metoprolol and good blood pressure control  #4 hyperlipidemia treated with simvastatin 10 mg.  Lipid     Clinically, no evidence of congestive heart failure or active ischemia at the time of evaluation.  The patient is pleased with the clinical outcome.  He will continue metoprolol for palpitations due to premature atrial complex, atorvastatin for management of hyperlipidemia and baby aspirin.    We will see him back in one year R depends on patient clinical conditions         Brown was seen today for follow-up.    Diagnoses and all orders for this visit:    Typical atrial flutter (CMS/HCC)    Hyperlipidemia, unspecified hyperlipidemia type    Palpitation        Ceferino Young  MD  2/26/2020  9:22 AM   Negative